# Patient Record
Sex: FEMALE | Race: WHITE | Employment: UNEMPLOYED | URBAN - METROPOLITAN AREA
[De-identification: names, ages, dates, MRNs, and addresses within clinical notes are randomized per-mention and may not be internally consistent; named-entity substitution may affect disease eponyms.]

---

## 2019-01-24 PROBLEM — Z86.69 HISTORY OF SEIZURES AS A CHILD: Status: ACTIVE | Noted: 2019-01-24

## 2019-01-24 PROBLEM — Z87.81 HISTORY OF PELVIC FRACTURE: Status: ACTIVE | Noted: 2019-01-24

## 2019-08-27 ENCOUNTER — HOSPITAL ENCOUNTER (EMERGENCY)
Age: 24
Discharge: HOME | End: 2019-08-28
Attending: EMERGENCY MEDICINE
Payer: COMMERCIAL

## 2019-08-27 DIAGNOSIS — O03.9 ABORTION, SPONTANEOUS COMPLETE: Primary | ICD-10-CM

## 2019-08-27 PROCEDURE — 99284 EMERGENCY DEPT VISIT MOD MDM: CPT

## 2019-08-27 ASSESSMENT — PAIN SCALES - GENERAL: PAINLEVEL_OUTOF10: 9

## 2019-08-27 ASSESSMENT — PAIN DESCRIPTION - ONSET: ONSET: ON-GOING

## 2019-08-27 ASSESSMENT — PAIN DESCRIPTION - DESCRIPTORS: DESCRIPTORS: DISCOMFORT;STABBING;ACHING

## 2019-08-27 ASSESSMENT — PAIN - FUNCTIONAL ASSESSMENT: PAIN_FUNCTIONAL_ASSESSMENT: PREVENTS OR INTERFERES SOME ACTIVE ACTIVITIES AND ADLS

## 2019-08-27 ASSESSMENT — PAIN DESCRIPTION - ORIENTATION: ORIENTATION: LOWER

## 2019-08-27 ASSESSMENT — PAIN DESCRIPTION - LOCATION: LOCATION: ABDOMEN

## 2019-08-27 ASSESSMENT — PAIN DESCRIPTION - FREQUENCY: FREQUENCY: CONTINUOUS

## 2019-08-28 ENCOUNTER — APPOINTMENT (OUTPATIENT)
Dept: ULTRASOUND IMAGING | Age: 24
End: 2019-08-28
Payer: COMMERCIAL

## 2019-08-28 VITALS
DIASTOLIC BLOOD PRESSURE: 73 MMHG | TEMPERATURE: 99 F | HEART RATE: 71 BPM | OXYGEN SATURATION: 98 % | HEIGHT: 65 IN | BODY MASS INDEX: 23.52 KG/M2 | SYSTOLIC BLOOD PRESSURE: 118 MMHG | WEIGHT: 141.13 LBS | RESPIRATION RATE: 18 BRPM

## 2019-08-28 LAB
ALBUMIN SERPL-MCNC: 4.2 G/DL (ref 3.5–5.2)
ALP BLD-CCNC: 153 U/L (ref 35–104)
ALT SERPL-CCNC: 7 U/L (ref 0–32)
ANION GAP SERPL CALCULATED.3IONS-SCNC: 11 MMOL/L (ref 7–16)
AST SERPL-CCNC: 13 U/L (ref 0–31)
BASOPHILS ABSOLUTE: 0.11 E9/L (ref 0–0.2)
BASOPHILS RELATIVE PERCENT: 1.3 % (ref 0–2)
BILIRUB SERPL-MCNC: <0.2 MG/DL (ref 0–1.2)
BILIRUBIN URINE: NEGATIVE
BLOOD, URINE: NEGATIVE
BUN BLDV-MCNC: 14 MG/DL (ref 6–20)
CALCIUM SERPL-MCNC: 8.7 MG/DL (ref 8.6–10.2)
CHLORIDE BLD-SCNC: 106 MMOL/L (ref 98–107)
CLARITY: CLEAR
CO2: 26 MMOL/L (ref 22–29)
COLOR: YELLOW
CREAT SERPL-MCNC: 1 MG/DL (ref 0.5–1)
EOSINOPHILS ABSOLUTE: 0.71 E9/L (ref 0.05–0.5)
EOSINOPHILS RELATIVE PERCENT: 8.2 % (ref 0–6)
GFR AFRICAN AMERICAN: >60
GFR NON-AFRICAN AMERICAN: >60 ML/MIN/1.73
GLUCOSE BLD-MCNC: 90 MG/DL (ref 74–99)
GLUCOSE URINE: NEGATIVE MG/DL
GONADOTROPIN, CHORIONIC (HCG) QUANT: <0.1 MIU/ML
HCT VFR BLD CALC: 40 % (ref 34–48)
HEMOGLOBIN: 13.1 G/DL (ref 11.5–15.5)
IMMATURE GRANULOCYTES #: 0.01 E9/L
IMMATURE GRANULOCYTES %: 0.1 % (ref 0–5)
KETONES, URINE: NEGATIVE MG/DL
LEUKOCYTE ESTERASE, URINE: NEGATIVE
LYMPHOCYTES ABSOLUTE: 2.93 E9/L (ref 1.5–4)
LYMPHOCYTES RELATIVE PERCENT: 33.8 % (ref 20–42)
MCH RBC QN AUTO: 27.5 PG (ref 26–35)
MCHC RBC AUTO-ENTMCNC: 32.8 % (ref 32–34.5)
MCV RBC AUTO: 83.9 FL (ref 80–99.9)
MONOCYTES ABSOLUTE: 0.77 E9/L (ref 0.1–0.95)
MONOCYTES RELATIVE PERCENT: 8.9 % (ref 2–12)
NEUTROPHILS ABSOLUTE: 4.13 E9/L (ref 1.8–7.3)
NEUTROPHILS RELATIVE PERCENT: 47.7 % (ref 43–80)
NITRITE, URINE: NEGATIVE
PDW BLD-RTO: 14.8 FL (ref 11.5–15)
PH UA: 6 (ref 5–9)
PLATELET # BLD: 305 E9/L (ref 130–450)
PMV BLD AUTO: 11.9 FL (ref 7–12)
POTASSIUM SERPL-SCNC: 4.1 MMOL/L (ref 3.5–5)
PROTEIN UA: NEGATIVE MG/DL
RBC # BLD: 4.77 E12/L (ref 3.5–5.5)
SODIUM BLD-SCNC: 143 MMOL/L (ref 132–146)
SPECIFIC GRAVITY UA: 1.02 (ref 1–1.03)
TOTAL PROTEIN: 6.8 G/DL (ref 6.4–8.3)
UROBILINOGEN, URINE: 0.2 E.U./DL
WBC # BLD: 8.7 E9/L (ref 4.5–11.5)

## 2019-08-28 PROCEDURE — 6370000000 HC RX 637 (ALT 250 FOR IP): Performed by: EMERGENCY MEDICINE

## 2019-08-28 PROCEDURE — 84702 CHORIONIC GONADOTROPIN TEST: CPT

## 2019-08-28 PROCEDURE — 80053 COMPREHEN METABOLIC PANEL: CPT

## 2019-08-28 PROCEDURE — 76817 TRANSVAGINAL US OBSTETRIC: CPT

## 2019-08-28 PROCEDURE — 85025 COMPLETE CBC W/AUTO DIFF WBC: CPT

## 2019-08-28 PROCEDURE — 81003 URINALYSIS AUTO W/O SCOPE: CPT

## 2019-08-28 PROCEDURE — 36415 COLL VENOUS BLD VENIPUNCTURE: CPT

## 2019-08-28 RX ORDER — HYDROCODONE BITARTRATE AND ACETAMINOPHEN 5; 325 MG/1; MG/1
1 TABLET ORAL ONCE
Status: COMPLETED | OUTPATIENT
Start: 2019-08-28 | End: 2019-08-28

## 2019-08-28 RX ADMIN — HYDROCODONE BITARTRATE AND ACETAMINOPHEN 1 TABLET: 5; 325 TABLET ORAL at 01:31

## 2019-08-28 ASSESSMENT — ENCOUNTER SYMPTOMS
VOMITING: 0
ABDOMINAL PAIN: 1
NAUSEA: 0
COUGH: 0
EYE DISCHARGE: 0
ABDOMINAL DISTENTION: 0
WHEEZING: 0
EYE REDNESS: 0
BACK PAIN: 0
DIARRHEA: 0
SORE THROAT: 0
SHORTNESS OF BREATH: 0
SINUS PRESSURE: 0
EYE PAIN: 0

## 2021-02-17 ENCOUNTER — APPOINTMENT (OUTPATIENT)
Dept: GENERAL RADIOLOGY | Age: 26
End: 2021-02-17
Payer: COMMERCIAL

## 2021-02-17 ENCOUNTER — HOSPITAL ENCOUNTER (EMERGENCY)
Age: 26
Discharge: HOME | End: 2021-02-17
Payer: COMMERCIAL

## 2021-02-17 VITALS
HEART RATE: 71 BPM | HEIGHT: 65 IN | OXYGEN SATURATION: 96 % | DIASTOLIC BLOOD PRESSURE: 67 MMHG | BODY MASS INDEX: 23.32 KG/M2 | RESPIRATION RATE: 20 BRPM | SYSTOLIC BLOOD PRESSURE: 99 MMHG | WEIGHT: 140 LBS | TEMPERATURE: 97.7 F

## 2021-02-17 DIAGNOSIS — S76.011A HIP STRAIN, RIGHT, INITIAL ENCOUNTER: Primary | ICD-10-CM

## 2021-02-17 LAB
HCG, URINE, POC: NEGATIVE
Lab: NORMAL
NEGATIVE QC PASS/FAIL: NORMAL
POSITIVE QC PASS/FAIL: NORMAL

## 2021-02-17 PROCEDURE — 6370000000 HC RX 637 (ALT 250 FOR IP): Performed by: PHYSICIAN ASSISTANT

## 2021-02-17 PROCEDURE — 99285 EMERGENCY DEPT VISIT HI MDM: CPT

## 2021-02-17 PROCEDURE — 73502 X-RAY EXAM HIP UNI 2-3 VIEWS: CPT

## 2021-02-17 RX ORDER — CYCLOBENZAPRINE HCL 10 MG
10 TABLET ORAL ONCE
Status: COMPLETED | OUTPATIENT
Start: 2021-02-17 | End: 2021-02-17

## 2021-02-17 RX ORDER — CYCLOBENZAPRINE HCL 10 MG
10 TABLET ORAL 3 TIMES DAILY PRN
Qty: 21 TABLET | Refills: 0 | Status: SHIPPED | OUTPATIENT
Start: 2021-02-17 | End: 2021-02-27

## 2021-02-17 RX ORDER — ACETAMINOPHEN 500 MG
1000 TABLET ORAL ONCE
Status: COMPLETED | OUTPATIENT
Start: 2021-02-17 | End: 2021-02-17

## 2021-02-17 RX ADMIN — ACETAMINOPHEN 1000 MG: 500 TABLET, COATED ORAL at 10:55

## 2021-02-17 RX ADMIN — CYCLOBENZAPRINE HYDROCHLORIDE 10 MG: 10 TABLET, FILM COATED ORAL at 10:56

## 2021-02-17 ASSESSMENT — ENCOUNTER SYMPTOMS
COLOR CHANGE: 0
FACIAL SWELLING: 0
SINUS PAIN: 0
BACK PAIN: 0
SHORTNESS OF BREATH: 0
TROUBLE SWALLOWING: 0
COUGH: 0
CHEST TIGHTNESS: 0
SORE THROAT: 0
SINUS PRESSURE: 0

## 2021-02-17 ASSESSMENT — PAIN DESCRIPTION - DESCRIPTORS: DESCRIPTORS: STABBING

## 2021-02-17 NOTE — ED PROVIDER NOTES
[facility]        Department of Emergency Medicine   ED  Provider Note  Admit Date/RoomTime: 2/17/2021 10:23 AM  ED Room: 14/14  HPI:  2/17/21, Time: 10:53 AM EST      The patient is a 66-year-old female presenting to the emergency department with right hip pain. Patient states 6 days ago she dove onto the ground to catch her son from falling and landed predominantly on her right hip and buttock. She states since then she has had pain radiating from her hip down into her knee. The pain is worse with bearing weight and movement. Patient has been taking Tylenol and ibuprofen without relief. She did go to urgent care several days ago and states they only x-rayed her knee which was unremarkable. Patient describes it as sharp aching pains. She denies any additional injury, swelling in the leg, numbness or tingling in the lower extremity, low back pain, dysuria, hematuria. The history is provided by the patient. No  was used. REVIEW OF SYSTEMS:  Review of Systems   Constitutional: Negative for activity change, chills, fatigue and fever. HENT: Negative for congestion, ear pain, facial swelling, sinus pressure, sinus pain, sore throat and trouble swallowing. Respiratory: Negative for cough, chest tightness and shortness of breath. Cardiovascular: Negative for chest pain, palpitations and leg swelling. Genitourinary: Negative for dysuria, flank pain, frequency and hematuria. Musculoskeletal: Positive for arthralgias. Negative for back pain, neck pain and neck stiffness. Skin: Negative for color change, pallor and rash. Neurological: Negative for dizziness, weakness, light-headedness and headaches. Psychiatric/Behavioral: Negative for agitation, behavioral problems and confusion.       Pertinent positives and negatives are stated within HPI, all other systems reviewed and are negative.      --------------------------------------------- PAST HISTORY ---------------------------------------------  Past Medical History:  has a past medical history of Acid reflux, ADD (attention deficit disorder with hyperactivity), ADHD (attention deficit hyperactivity disorder), Asthma, [facility] Fetal alcohol syndrome, Hypoglycemia, Polyhydramnios, PTSD (post-traumatic stress disorder), and Seizures ([facility] Past Surgical History:  has no past surgical history on file. Social History:  reports that she has been smoking cigarettes. She has been smoking about 0.25 packs per day. She has never used smokeless tobacco. She reports that she does not drink alcohol or use drugs. Family History: family history is not on file. The patients home medications have been reviewed. Allergies: Latex, Bee venom, Zithromax [azithromycin], Amoxil [amoxicillin], Aspirin, Clindamycin/lincomycin, Gramineae pollens, Penicillins, Penicillins cross reactors, Pineapple, Prednisone, Tramadol, and Zofran [ondansetron hcl]    -------------------------------------------------- RESULTS -------------------------------------------------  All laboratory and radiology results have been personally reviewed by myself   LABS:  Results for orders placed or performed during the hospital encounter of 02/17/21   POC Pregnancy Urine   Result Value Ref Range    HCG, Urine, POC Negative Negative    Lot Number [id number]     Positive QC Pass/Fail Pass     Negative QC Pass/Fail Pass        RADIOLOGY:  Interpreted by Radiologist.  XR HIP RIGHT (2-3 VIEWS)   Final Result   No acute osseous abnormality is identified.          ------------------------- NURSING NOTES AND VITALS REVIEWED ---------------------------   The nursing notes within the ED encounter and vital signs as below have been reviewed.    BP 99/67   Pulse 71   Temp 97.7 °F (36.5 °C) (Oral)   Resp 20   Ht 5' 5\" (1.651 m)   Wt 140 lb (63.5 kg)   LMP 02/10/2021   SpO2 96%   BMI 23.30 kg/m²   Oxygen Saturation Interpretation: Normal      ---------------------------------------------------PHYSICAL EXAM--------------------------------------    Physical Exam  Vitals signs and nursing note reviewed. Constitutional:       General: She is not in acute distress. Appearance: Normal appearance. She is well-developed. She is not ill-appearing. HENT:      Head: Normocephalic and atraumatic. Mouth/Throat:      Mouth: Mucous membranes are moist.      Pharynx: Oropharynx is clear. Neck:      Musculoskeletal: Normal range of motion and neck supple. No muscular tenderness. Vascular: No JVD. Trachea: No tracheal deviation. Cardiovascular:      Rate and Rhythm: Normal rate and regular rhythm. Heart sounds: Normal heart sounds. No murmur. Pulmonary:      Effort: Pulmonary effort is normal. No respiratory distress. Breath sounds: Normal breath sounds. Musculoskeletal: Normal range of motion. General: Tenderness present. No deformity. Comments: Tenderness over the right lateral hip and proximal femur. Tenderness over the right buttock. No deformity or bruising. Pain with internal and external rotation. +5/5 BLE strength. Skin:     General: Skin is warm and dry. Capillary Refill: Capillary refill takes less than 2 seconds. Coloration: Skin is not pale. Findings: No erythema. Neurological:      Mental Status: She is alert and oriented to person, place, and time. Mental status is at baseline. Motor: No weakness. Psychiatric:         Behavior: Behavior normal.         Thought Content: Thought content normal.            ------------------------------ ED COURSE/MEDICAL DECISION MAKING----------------------  Medications   acetaminophen (TYLENOL) tablet 1,000 mg (1,000 mg Oral Given 2/17/21 1055)   cyclobenzaprine (FLEXERIL) tablet 10 mg (10 mg Oral Given 2/17/21 1056)         ED COURSE:      XR HIP RIGHT (2-3 VIEWS)   Final Result   No acute osseous abnormality is identified. Procedures:  Procedures     Medical Decision Making:   MDM   78-year-old female presenting the emergency with 6 days ago. Patient has no signs of neurovascular compromise. She states she is allergic to Toradol and just took ibuprofen prior to arrival.  She is given Tylenol and Flexeril. Patient does report her pain is slightly improved. X-ray shows no acute fractures or dislocations. This is likely hip strain versus contusion. Patient is encouraged to continue taking Tylenol and ibuprofen and given a prescription for the Flexeril. She is given crutches to use as needed if bearing weight is making the pain worse. She is to follow-up with her PCP within a week if symptoms are not improving for further treatment and management. Counseling: The emergency provider has spoken with the patient and discussed todays results, in addition to providing specific details for the plan of care and counseling regarding the diagnosis and prognosis. Questions are answered at this time and they are agreeable with the plan.      --------------------------------- IMPRESSION AND DISPOSITION ---------------------------------    IMPRESSION  1. Hip strain, right, initial encounter        DISPOSITION  Disposition: Discharge to home  Patient condition is good      Electronically signed by Fernando Barkley PA-C   DD: 2/17/21  **This report was transcribed using voice recognition software. Every effort was made to ensure accuracy; however, inadvertent computerized transcription errors may be present.   END OF ED PROVIDER NOTE         Fernando Barkley PA-C  02/17/21 1148

## 2021-04-19 ENCOUNTER — HOSPITAL ENCOUNTER (EMERGENCY)
Age: 26
Discharge: HOME | End: 2021-04-19
Payer: COMMERCIAL

## 2023-04-12 ENCOUNTER — HOSPITAL ENCOUNTER (OUTPATIENT)
Dept: DATA CONVERSION | Facility: HOSPITAL | Age: 28
End: 2023-04-12
Attending: OBSTETRICS & GYNECOLOGY | Admitting: OBSTETRICS & GYNECOLOGY
Payer: COMMERCIAL

## 2023-04-12 DIAGNOSIS — Z88.0 ALLERGY STATUS TO PENICILLIN: ICD-10-CM

## 2023-04-12 DIAGNOSIS — Z30.2 ENCOUNTER FOR STERILIZATION: ICD-10-CM

## 2023-04-12 DIAGNOSIS — Z91.040 LATEX ALLERGY STATUS: ICD-10-CM

## 2023-04-12 DIAGNOSIS — J45.909 UNSPECIFIED ASTHMA, UNCOMPLICATED (HHS-HCC): ICD-10-CM

## 2023-04-12 DIAGNOSIS — F32.A DEPRESSION, UNSPECIFIED: ICD-10-CM

## 2023-04-12 DIAGNOSIS — F41.9 ANXIETY DISORDER, UNSPECIFIED: ICD-10-CM

## 2023-04-12 DIAGNOSIS — N92.1 EXCESSIVE AND FREQUENT MENSTRUATION WITH IRREGULAR CYCLE: ICD-10-CM

## 2023-04-12 DIAGNOSIS — F17.200 NICOTINE DEPENDENCE, UNSPECIFIED, UNCOMPLICATED: ICD-10-CM

## 2023-04-12 DIAGNOSIS — D06.9 CARCINOMA IN SITU OF CERVIX, UNSPECIFIED: ICD-10-CM

## 2023-04-12 LAB
ATRIAL RATE: 75 BPM
P AXIS: 18 DEGREES
PR INTERVAL: 143 MS
Q ONSET: 252 MS
QRS COUNT: 12 BEATS
QRS DURATION: 97 MS
QT INTERVAL: 392 MS
QTC CALCULATION(BAZETT): 438 MS
QTC FREDERICIA: 422 MS
R AXIS: 52 DEGREES
T AXIS: 49 DEGREES
T OFFSET: 448 MS
VENTRICULAR RATE: 75 BPM

## 2023-04-19 LAB
COMPLETE PATHOLOGY REPORT: NORMAL
CONVERTED CLINICAL DIAGNOSIS-HISTORY: NORMAL
CONVERTED FINAL DIAGNOSIS: NORMAL
CONVERTED FINAL REPORT PDF LINK TO COPY AND PASTE: NORMAL
CONVERTED GROSS DESCRIPTION: NORMAL

## 2023-05-02 LAB — HCG, URINE: NEGATIVE

## 2023-06-08 ENCOUNTER — OFFICE VISIT (OUTPATIENT)
Dept: PRIMARY CARE | Facility: CLINIC | Age: 28
End: 2023-06-08
Payer: COMMERCIAL

## 2023-06-08 VITALS
BODY MASS INDEX: 22.92 KG/M2 | HEART RATE: 116 BPM | DIASTOLIC BLOOD PRESSURE: 72 MMHG | WEIGHT: 137.6 LBS | SYSTOLIC BLOOD PRESSURE: 114 MMHG | HEIGHT: 65 IN | OXYGEN SATURATION: 98 % | RESPIRATION RATE: 16 BRPM

## 2023-06-08 DIAGNOSIS — Z13.220 SCREENING FOR HYPERLIPIDEMIA: ICD-10-CM

## 2023-06-08 DIAGNOSIS — R11.2 NAUSEA AND VOMITING, UNSPECIFIED VOMITING TYPE: ICD-10-CM

## 2023-06-08 DIAGNOSIS — J45.40 MODERATE PERSISTENT ASTHMA, UNSPECIFIED WHETHER COMPLICATED (HHS-HCC): Primary | ICD-10-CM

## 2023-06-08 DIAGNOSIS — F32.A ANXIETY AND DEPRESSION: ICD-10-CM

## 2023-06-08 DIAGNOSIS — E53.8 VITAMIN B 12 DEFICIENCY: ICD-10-CM

## 2023-06-08 DIAGNOSIS — Z13.29 SCREENING FOR HYPOTHYROIDISM: ICD-10-CM

## 2023-06-08 DIAGNOSIS — L25.9 CONTACT DERMATITIS, UNSPECIFIED CONTACT DERMATITIS TYPE, UNSPECIFIED TRIGGER: ICD-10-CM

## 2023-06-08 DIAGNOSIS — Z91.030 BEE ALLERGY STATUS: ICD-10-CM

## 2023-06-08 DIAGNOSIS — E55.9 VITAMIN D DEFICIENCY: ICD-10-CM

## 2023-06-08 DIAGNOSIS — K21.9 GASTROESOPHAGEAL REFLUX DISEASE WITHOUT ESOPHAGITIS: ICD-10-CM

## 2023-06-08 DIAGNOSIS — R73.01 ELEVATED FASTING BLOOD SUGAR: ICD-10-CM

## 2023-06-08 DIAGNOSIS — F41.9 ANXIETY AND DEPRESSION: ICD-10-CM

## 2023-06-08 DIAGNOSIS — G43.809 OTHER MIGRAINE WITHOUT STATUS MIGRAINOSUS, NOT INTRACTABLE: ICD-10-CM

## 2023-06-08 PROBLEM — B96.89 BACTERIAL VAGINOSIS: Status: RESOLVED | Noted: 2023-06-08 | Resolved: 2023-06-08

## 2023-06-08 PROBLEM — O26.891 VAGINAL DISCHARGE DURING PREGNANCY IN FIRST TRIMESTER (HHS-HCC): Status: ACTIVE | Noted: 2023-06-08

## 2023-06-08 PROBLEM — J06.9 UPPER RESPIRATORY INFECTION, ACUTE: Status: RESOLVED | Noted: 2023-06-08 | Resolved: 2023-06-08

## 2023-06-08 PROBLEM — O99.340 DEPRESSION AFFECTING PREGNANCY (HHS-HCC): Status: RESOLVED | Noted: 2019-01-07 | Resolved: 2023-06-08

## 2023-06-08 PROBLEM — O03.9 SAB (SPONTANEOUS ABORTION) (HHS-HCC): Status: RESOLVED | Noted: 2023-06-08 | Resolved: 2023-06-08

## 2023-06-08 PROBLEM — G43.909 MIGRAINE WITHOUT STATUS MIGRAINOSUS, NOT INTRACTABLE: Status: ACTIVE | Noted: 2023-06-08

## 2023-06-08 PROBLEM — O26.841: Status: RESOLVED | Noted: 2023-06-08 | Resolved: 2023-06-08

## 2023-06-08 PROBLEM — O09.899 SUPERVISION OF OTHER HIGH RISK PREGNANCY, ANTEPARTUM (HHS-HCC): Status: ACTIVE | Noted: 2019-01-04

## 2023-06-08 PROBLEM — O21.9 NAUSEA/VOMITING IN PREGNANCY (HHS-HCC): Status: RESOLVED | Noted: 2023-06-08 | Resolved: 2023-06-08

## 2023-06-08 PROBLEM — R09.89 CHEST CONGESTION: Status: ACTIVE | Noted: 2023-06-08

## 2023-06-08 PROBLEM — Z97.5 IUD (INTRAUTERINE DEVICE) IN PLACE: Status: ACTIVE | Noted: 2023-06-08

## 2023-06-08 PROBLEM — O99.519 ASTHMA AFFECTING PREGNANCY, ANTEPARTUM (HHS-HCC): Status: ACTIVE | Noted: 2019-01-07

## 2023-06-08 PROBLEM — O20.0 THREATENED ABORTION (HHS-HCC): Status: ACTIVE | Noted: 2023-06-08

## 2023-06-08 PROBLEM — O26.899 HEARTBURN IN PREGNANCY (HHS-HCC): Status: ACTIVE | Noted: 2023-06-08

## 2023-06-08 PROBLEM — O26.841: Status: ACTIVE | Noted: 2023-06-08

## 2023-06-08 PROBLEM — T21.55XA: Status: ACTIVE | Noted: 2023-06-08

## 2023-06-08 PROBLEM — J06.9 UPPER RESPIRATORY INFECTION, ACUTE: Status: ACTIVE | Noted: 2023-06-08

## 2023-06-08 PROBLEM — R87.612 LGSIL ON PAP SMEAR OF CERVIX: Status: RESOLVED | Noted: 2023-06-08 | Resolved: 2023-06-08

## 2023-06-08 PROBLEM — O99.340 DEPRESSION AFFECTING PREGNANCY (HHS-HCC): Status: ACTIVE | Noted: 2019-01-07

## 2023-06-08 PROBLEM — O99.519 ASTHMA AFFECTING PREGNANCY, ANTEPARTUM (HHS-HCC): Status: RESOLVED | Noted: 2019-01-07 | Resolved: 2023-06-08

## 2023-06-08 PROBLEM — N97.9 INFERTILITY, FEMALE: Status: ACTIVE | Noted: 2023-06-08

## 2023-06-08 PROBLEM — Z86.69 HISTORY OF SEIZURES AS A CHILD: Status: RESOLVED | Noted: 2019-01-24 | Resolved: 2023-06-08

## 2023-06-08 PROBLEM — J45.909 ASTHMA AFFECTING PREGNANCY, ANTEPARTUM (HHS-HCC): Status: ACTIVE | Noted: 2019-01-07

## 2023-06-08 PROBLEM — T21.55XA: Status: RESOLVED | Noted: 2023-06-08 | Resolved: 2023-06-08

## 2023-06-08 PROBLEM — R87.612 LGSIL ON PAP SMEAR OF CERVIX: Status: ACTIVE | Noted: 2023-06-08

## 2023-06-08 PROBLEM — S69.92XA FINGER INJURY, LEFT, INITIAL ENCOUNTER: Status: RESOLVED | Noted: 2023-06-08 | Resolved: 2023-06-08

## 2023-06-08 PROBLEM — R10.2 PELVIC PAIN: Status: ACTIVE | Noted: 2023-06-08

## 2023-06-08 PROBLEM — J45.909 ASTHMA AFFECTING PREGNANCY, ANTEPARTUM (HHS-HCC): Status: RESOLVED | Noted: 2019-01-07 | Resolved: 2023-06-08

## 2023-06-08 PROBLEM — O36.5990 INTRAUTERINE GROWTH RESTRICTION (IUGR) AFFECTING CARE OF MOTHER (HHS-HCC): Status: ACTIVE | Noted: 2019-01-07

## 2023-06-08 PROBLEM — S83.91XA SPRAIN OF RIGHT KNEE: Status: RESOLVED | Noted: 2023-06-08 | Resolved: 2023-06-08

## 2023-06-08 PROBLEM — S83.91XA SPRAIN OF RIGHT KNEE: Status: ACTIVE | Noted: 2023-06-08

## 2023-06-08 PROBLEM — R10.2 PELVIC PAIN: Status: RESOLVED | Noted: 2023-06-08 | Resolved: 2023-06-08

## 2023-06-08 PROBLEM — S89.91XA INJURY OF RIGHT KNEE: Status: ACTIVE | Noted: 2023-06-08

## 2023-06-08 PROBLEM — R12 HEARTBURN IN PREGNANCY (HHS-HCC): Status: RESOLVED | Noted: 2023-06-08 | Resolved: 2023-06-08

## 2023-06-08 PROBLEM — O09.899 SUPERVISION OF OTHER HIGH RISK PREGNANCY, ANTEPARTUM (HHS-HCC): Status: RESOLVED | Noted: 2019-01-04 | Resolved: 2023-06-08

## 2023-06-08 PROBLEM — N89.8 VAGINAL DISCHARGE DURING PREGNANCY IN FIRST TRIMESTER (HHS-HCC): Status: RESOLVED | Noted: 2023-06-08 | Resolved: 2023-06-08

## 2023-06-08 PROBLEM — R05.9 COUGH: Status: ACTIVE | Noted: 2023-06-08

## 2023-06-08 PROBLEM — J06.9 VIRAL URI WITH COUGH: Status: ACTIVE | Noted: 2023-06-08

## 2023-06-08 PROBLEM — O21.9 NAUSEA/VOMITING IN PREGNANCY (HHS-HCC): Status: ACTIVE | Noted: 2023-06-08

## 2023-06-08 PROBLEM — K04.7 TOOTH ABSCESS: Status: ACTIVE | Noted: 2023-06-08

## 2023-06-08 PROBLEM — O03.9 SAB (SPONTANEOUS ABORTION) (HHS-HCC): Status: ACTIVE | Noted: 2023-06-08

## 2023-06-08 PROBLEM — R12 HEARTBURN IN PREGNANCY (HHS-HCC): Status: ACTIVE | Noted: 2023-06-08

## 2023-06-08 PROBLEM — S69.90XA THUMB INJURY: Status: ACTIVE | Noted: 2023-06-08

## 2023-06-08 PROBLEM — N76.0 BACTERIAL VAGINOSIS: Status: RESOLVED | Noted: 2023-06-08 | Resolved: 2023-06-08

## 2023-06-08 PROBLEM — S90.30XA FOOT CONTUSION: Status: RESOLVED | Noted: 2023-06-08 | Resolved: 2023-06-08

## 2023-06-08 PROBLEM — N96 RECURRENT PREGNANCY LOSS WITHOUT CURRENT PREGNANCY: Status: ACTIVE | Noted: 2019-01-07

## 2023-06-08 PROBLEM — S69.91XA INJURY OF FINGER OF RIGHT HAND: Status: RESOLVED | Noted: 2023-06-08 | Resolved: 2023-06-08

## 2023-06-08 PROBLEM — N97.9 INFERTILITY, FEMALE: Status: RESOLVED | Noted: 2023-06-08 | Resolved: 2023-06-08

## 2023-06-08 PROBLEM — S69.90XA THUMB INJURY: Status: RESOLVED | Noted: 2023-06-08 | Resolved: 2023-06-08

## 2023-06-08 PROBLEM — S69.92XA FINGER INJURY, LEFT, INITIAL ENCOUNTER: Status: ACTIVE | Noted: 2023-06-08

## 2023-06-08 PROBLEM — S92.515A CLOSED NONDISPLACED FRACTURE OF PROXIMAL PHALANX OF LESSER TOE OF LEFT FOOT: Status: RESOLVED | Noted: 2023-06-08 | Resolved: 2023-06-08

## 2023-06-08 PROBLEM — S69.91XA INJURY OF FINGER OF RIGHT HAND: Status: ACTIVE | Noted: 2023-06-08

## 2023-06-08 PROBLEM — J06.9 VIRAL URI WITH COUGH: Status: RESOLVED | Noted: 2023-06-08 | Resolved: 2023-06-08

## 2023-06-08 PROBLEM — N76.0 BACTERIAL VAGINOSIS: Status: ACTIVE | Noted: 2023-06-08

## 2023-06-08 PROBLEM — S99.921A RIGHT FOOT INJURY: Status: ACTIVE | Noted: 2023-06-08

## 2023-06-08 PROBLEM — N92.6 MISSED PERIOD: Status: RESOLVED | Noted: 2023-06-08 | Resolved: 2023-06-08

## 2023-06-08 PROBLEM — Z97.5 IUD (INTRAUTERINE DEVICE) IN PLACE: Status: RESOLVED | Noted: 2023-06-08 | Resolved: 2023-06-08

## 2023-06-08 PROBLEM — O36.5990 INTRAUTERINE GROWTH RESTRICTION (IUGR) AFFECTING CARE OF MOTHER (HHS-HCC): Status: RESOLVED | Noted: 2019-01-07 | Resolved: 2023-06-08

## 2023-06-08 PROBLEM — R05.9 COUGH: Status: RESOLVED | Noted: 2023-06-08 | Resolved: 2023-06-08

## 2023-06-08 PROBLEM — Z86.69 HISTORY OF SEIZURES AS A CHILD: Status: ACTIVE | Noted: 2019-01-24

## 2023-06-08 PROBLEM — K02.9 PAIN DUE TO DENTAL CARIES: Status: RESOLVED | Noted: 2023-06-08 | Resolved: 2023-06-08

## 2023-06-08 PROBLEM — R09.89 CHEST CONGESTION: Status: RESOLVED | Noted: 2023-06-08 | Resolved: 2023-06-08

## 2023-06-08 PROBLEM — D06.9 CIN III (CERVICAL INTRAEPITHELIAL NEOPLASIA III): Status: ACTIVE | Noted: 2023-06-08

## 2023-06-08 PROBLEM — S92.515A CLOSED NONDISPLACED FRACTURE OF PROXIMAL PHALANX OF LESSER TOE OF LEFT FOOT: Status: ACTIVE | Noted: 2023-06-08

## 2023-06-08 PROBLEM — O26.899 HEARTBURN IN PREGNANCY (HHS-HCC): Status: RESOLVED | Noted: 2023-06-08 | Resolved: 2023-06-08

## 2023-06-08 PROBLEM — S99.921A RIGHT FOOT INJURY: Status: RESOLVED | Noted: 2023-06-08 | Resolved: 2023-06-08

## 2023-06-08 PROBLEM — S89.91XA INJURY OF RIGHT KNEE: Status: RESOLVED | Noted: 2023-06-08 | Resolved: 2023-06-08

## 2023-06-08 PROBLEM — D06.9 CIN III (CERVICAL INTRAEPITHELIAL NEOPLASIA III): Status: RESOLVED | Noted: 2023-06-08 | Resolved: 2023-06-08

## 2023-06-08 PROBLEM — O26.891 VAGINAL DISCHARGE DURING PREGNANCY IN FIRST TRIMESTER (HHS-HCC): Status: RESOLVED | Noted: 2023-06-08 | Resolved: 2023-06-08

## 2023-06-08 PROBLEM — K02.9 PAIN DUE TO DENTAL CARIES: Status: ACTIVE | Noted: 2023-06-08

## 2023-06-08 PROBLEM — S60.012A CONTUSION OF LEFT THUMB WITHOUT DAMAGE TO NAIL: Status: ACTIVE | Noted: 2023-06-08

## 2023-06-08 PROBLEM — B96.89 BACTERIAL VAGINOSIS: Status: ACTIVE | Noted: 2023-06-08

## 2023-06-08 PROBLEM — N92.6 MISSED PERIOD: Status: ACTIVE | Noted: 2023-06-08

## 2023-06-08 PROBLEM — N96 RECURRENT PREGNANCY LOSS WITHOUT CURRENT PREGNANCY: Status: RESOLVED | Noted: 2019-01-07 | Resolved: 2023-06-08

## 2023-06-08 PROBLEM — N89.8 VAGINAL DISCHARGE DURING PREGNANCY IN FIRST TRIMESTER (HHS-HCC): Status: ACTIVE | Noted: 2023-06-08

## 2023-06-08 PROBLEM — S60.012A CONTUSION OF LEFT THUMB WITHOUT DAMAGE TO NAIL: Status: RESOLVED | Noted: 2023-06-08 | Resolved: 2023-06-08

## 2023-06-08 PROBLEM — K04.7 TOOTH ABSCESS: Status: RESOLVED | Noted: 2023-06-08 | Resolved: 2023-06-08

## 2023-06-08 PROBLEM — S90.30XA FOOT CONTUSION: Status: ACTIVE | Noted: 2023-06-08

## 2023-06-08 PROBLEM — O20.0 THREATENED ABORTION (HHS-HCC): Status: RESOLVED | Noted: 2023-06-08 | Resolved: 2023-06-08

## 2023-06-08 PROCEDURE — 4004F PT TOBACCO SCREEN RCVD TLK: CPT

## 2023-06-08 PROCEDURE — 99204 OFFICE O/P NEW MOD 45 MIN: CPT

## 2023-06-08 RX ORDER — HYDROCORTISONE 1 G/100G
CREAM TOPICAL
Status: CANCELLED | OUTPATIENT
Start: 2023-06-08

## 2023-06-08 RX ORDER — ALBUTEROL SULFATE 0.83 MG/ML
SOLUTION RESPIRATORY (INHALATION)
COMMUNITY
Start: 2022-11-08 | End: 2023-06-08 | Stop reason: SDUPTHER

## 2023-06-08 RX ORDER — METOCLOPRAMIDE 10 MG/1
10 TABLET ORAL
Qty: 90 TABLET | Refills: 2 | Status: CANCELLED | OUTPATIENT
Start: 2023-06-08

## 2023-06-08 RX ORDER — BUSPIRONE HYDROCHLORIDE 10 MG/1
10 TABLET ORAL
COMMUNITY
Start: 2023-05-16

## 2023-06-08 RX ORDER — PROMETHAZINE HYDROCHLORIDE 12.5 MG/1
12.5 TABLET ORAL
COMMUNITY
End: 2023-06-08 | Stop reason: SDUPTHER

## 2023-06-08 RX ORDER — OMEPRAZOLE 40 MG/1
40 CAPSULE, DELAYED RELEASE ORAL DAILY
Qty: 90 CAPSULE | Refills: 2 | Status: SHIPPED | OUTPATIENT
Start: 2023-06-08 | End: 2023-07-12 | Stop reason: SDUPTHER

## 2023-06-08 RX ORDER — PROMETHAZINE HYDROCHLORIDE 12.5 MG/1
12.5 TABLET ORAL DAILY PRN
Qty: 30 TABLET | Refills: 0 | Status: SHIPPED | OUTPATIENT
Start: 2023-06-08 | End: 2023-07-12 | Stop reason: SDUPTHER

## 2023-06-08 RX ORDER — HYDROCORTISONE 1 G/100G
CREAM TOPICAL
COMMUNITY
Start: 2022-09-19 | End: 2023-07-12 | Stop reason: SDUPTHER

## 2023-06-08 RX ORDER — RISPERIDONE 0.5 MG/1
0.5 TABLET ORAL DAILY
Qty: 90 TABLET | Refills: 2 | Status: CANCELLED | OUTPATIENT
Start: 2023-06-08

## 2023-06-08 RX ORDER — EPINEPHRINE 0.3 MG/.3ML
INJECTION SUBCUTANEOUS
Qty: 2 EACH | Refills: 0 | Status: SHIPPED | OUTPATIENT
Start: 2023-06-08 | End: 2023-07-12 | Stop reason: SDUPTHER

## 2023-06-08 RX ORDER — ALBUTEROL SULFATE 0.83 MG/ML
SOLUTION RESPIRATORY (INHALATION)
Qty: 75 ML | Refills: 2 | Status: SHIPPED | OUTPATIENT
Start: 2023-06-08 | End: 2023-07-12 | Stop reason: SDUPTHER

## 2023-06-08 RX ORDER — ACETAMINOPHEN 325 MG/1
325 TABLET ORAL EVERY 6 HOURS PRN
COMMUNITY
Start: 2019-01-24 | End: 2023-07-12 | Stop reason: SDUPTHER

## 2023-06-08 RX ORDER — RISPERIDONE 0.5 MG/1
0.5 TABLET ORAL DAILY
COMMUNITY
Start: 2022-09-19

## 2023-06-08 RX ORDER — CLOBETASOL PROPIONATE 0.5 MG/G
CREAM TOPICAL 2 TIMES DAILY
Qty: 60 G | Refills: 2 | Status: SHIPPED | OUTPATIENT
Start: 2023-06-08 | End: 2023-06-22

## 2023-06-08 RX ORDER — EPINEPHRINE 0.3 MG/.3ML
INJECTION SUBCUTANEOUS
COMMUNITY
Start: 2018-10-19 | End: 2023-06-08 | Stop reason: SDUPTHER

## 2023-06-08 RX ORDER — MONTELUKAST SODIUM 10 MG/1
10 TABLET ORAL NIGHTLY
Qty: 90 TABLET | Refills: 0 | Status: SHIPPED | OUTPATIENT
Start: 2023-06-08 | End: 2023-07-12 | Stop reason: SDUPTHER

## 2023-06-08 RX ORDER — IBUPROFEN 800 MG/1
800 TABLET ORAL
COMMUNITY
Start: 2023-05-16 | End: 2023-06-08 | Stop reason: SDUPTHER

## 2023-06-08 RX ORDER — OMEPRAZOLE 40 MG/1
40 CAPSULE, DELAYED RELEASE ORAL DAILY
COMMUNITY
Start: 2022-04-14 | End: 2023-06-08 | Stop reason: SDUPTHER

## 2023-06-08 RX ORDER — CYCLOBENZAPRINE HCL 10 MG
10 TABLET ORAL
Qty: 90 TABLET | Refills: 2 | Status: CANCELLED | OUTPATIENT
Start: 2023-06-08

## 2023-06-08 RX ORDER — METOCLOPRAMIDE 10 MG/1
10 TABLET ORAL
COMMUNITY
Start: 2023-05-28 | End: 2023-06-08 | Stop reason: ALTCHOICE

## 2023-06-08 RX ORDER — CYCLOBENZAPRINE HCL 10 MG
10 TABLET ORAL
COMMUNITY
Start: 2022-10-08 | End: 2023-07-12 | Stop reason: SDUPTHER

## 2023-06-08 RX ORDER — BUSPIRONE HYDROCHLORIDE 10 MG/1
10 TABLET ORAL
Qty: 90 TABLET | Refills: 2 | Status: CANCELLED | OUTPATIENT
Start: 2023-06-08

## 2023-06-08 RX ORDER — MONTELUKAST SODIUM 10 MG/1
10 TABLET ORAL
COMMUNITY
Start: 2023-03-13 | End: 2023-06-08 | Stop reason: SDUPTHER

## 2023-06-08 RX ORDER — IBUPROFEN 800 MG/1
800 TABLET ORAL NIGHTLY
Qty: 90 TABLET | Refills: 0 | Status: SHIPPED | OUTPATIENT
Start: 2023-06-08 | End: 2023-07-12 | Stop reason: SDUPTHER

## 2023-06-08 ASSESSMENT — ENCOUNTER SYMPTOMS
EYES NEGATIVE: 1
PSYCHIATRIC NEGATIVE: 1
ENDOCRINE NEGATIVE: 1
GASTROINTESTINAL NEGATIVE: 1
OCCASIONAL FEELINGS OF UNSTEADINESS: 0
CONSTITUTIONAL NEGATIVE: 1
MUSCULOSKELETAL NEGATIVE: 1
LOSS OF SENSATION IN FEET: 0
CARDIOVASCULAR NEGATIVE: 1
HEMATOLOGIC/LYMPHATIC NEGATIVE: 1
ALLERGIC/IMMUNOLOGIC NEGATIVE: 1
NEUROLOGICAL NEGATIVE: 1
RESPIRATORY NEGATIVE: 1
DEPRESSION: 0

## 2023-06-08 ASSESSMENT — PATIENT HEALTH QUESTIONNAIRE - PHQ9
SUM OF ALL RESPONSES TO PHQ9 QUESTIONS 1 AND 2: 0
1. LITTLE INTEREST OR PLEASURE IN DOING THINGS: NOT AT ALL
2. FEELING DOWN, DEPRESSED OR HOPELESS: NOT AT ALL

## 2023-06-08 ASSESSMENT — ANXIETY QUESTIONNAIRES
2. NOT BEING ABLE TO STOP OR CONTROL WORRYING: NOT AT ALL
5. BEING SO RESTLESS THAT IT IS HARD TO SIT STILL: NOT AT ALL
3. WORRYING TOO MUCH ABOUT DIFFERENT THINGS: NOT AT ALL
GAD7 TOTAL SCORE: 0
IF YOU CHECKED OFF ANY PROBLEMS ON THIS QUESTIONNAIRE, HOW DIFFICULT HAVE THESE PROBLEMS MADE IT FOR YOU TO DO YOUR WORK, TAKE CARE OF THINGS AT HOME, OR GET ALONG WITH OTHER PEOPLE: NOT DIFFICULT AT ALL
7. FEELING AFRAID AS IF SOMETHING AWFUL MIGHT HAPPEN: NOT AT ALL
4. TROUBLE RELAXING: NOT AT ALL
6. BECOMING EASILY ANNOYED OR IRRITABLE: NOT AT ALL
1. FEELING NERVOUS, ANXIOUS, OR ON EDGE: NOT AT ALL

## 2023-06-08 NOTE — PROGRESS NOTES
"Subjective   Patient ID: Cari Reina is a 27 y.o. female who presents for New Patient Visit.    HPI a 27-year-old female with past medical history of anxiety and depression currently followed by [facility] for medication regimen and therapy, chronic nausea and vomiting, allergies to bees, mild persistent asthma, acid reflux, and chronic migraines arrives to clinic with chief complaint of new patient establishment.  The patient is here to establish with a new primary care provider as her previous one is no longer in network.  She does need her daily medication refills.  She continues to follow OB in regards to her women's gynecological exams.  She voices no concerns today.    Review of Systems   Constitutional: Negative.    HENT: Negative.     Eyes: Negative.    Respiratory: Negative.     Cardiovascular: Negative.    Gastrointestinal: Negative.    Endocrine: Negative.    Genitourinary: Negative.    Musculoskeletal: Negative.    Skin: Negative.    Allergic/Immunologic: Negative.    Neurological: Negative.    Hematological: Negative.    Psychiatric/Behavioral: Negative.     All other systems reviewed and are negative.      Objective   /72   Pulse (!) 116   Resp 16   Ht 1.651 m (5' 5\")   Wt 62.4 kg (137 lb 9.6 oz)   SpO2 98%   BMI 22.90 kg/m²     Physical Exam  Vitals and nursing note reviewed.   Constitutional:       Appearance: Normal appearance.   HENT:      Head: Normocephalic and atraumatic.      Right Ear: Tympanic membrane normal.      Left Ear: Tympanic membrane normal.      Nose: Nose normal.      Mouth/Throat:      Mouth: Mucous membranes are moist.      Pharynx: Oropharynx is clear.   Eyes:      Extraocular Movements: Extraocular movements intact.      Conjunctiva/sclera: Conjunctivae normal.      Pupils: Pupils are equal, round, and reactive to light.   Cardiovascular:      Rate and Rhythm: Normal rate and regular rhythm.   Pulmonary:      Effort: Pulmonary effort is normal.      Breath " sounds: Normal breath sounds.   Abdominal:      General: Bowel sounds are normal.      Palpations: Abdomen is soft.   Musculoskeletal:         General: Normal range of motion.      Cervical back: Normal range of motion and neck supple.   Skin:     General: Skin is warm.      Capillary Refill: Capillary refill takes less than 2 seconds.   Neurological:      General: No focal deficit present.      Mental Status: She is alert and oriented to person, place, and time. Mental status is at baseline.   Psychiatric:         Mood and Affect: Mood normal.         Behavior: Behavior normal.         Thought Content: Thought content normal.         Judgment: Judgment normal.         Assessment/Plan   Problem List Items Addressed This Visit          Endocrine/Metabolic    Vitamin B 12 deficiency    Relevant Orders    CBC    Vitamin B12    Follow Up In [facility] - PCP    Vitamin D deficiency    Relevant Orders    Vitamin D, Total    Follow [facility] - PCP       Other    Anxiety and depression    Elevated fasting blood sugar    Relevant Orders    Comprehensive Metabolic Panel    Hemoglobin A1C    Follow Up [facility] - PCP     Other Visit Diagnoses       Moderate persistent asthma, unspecified whether complicated    -  Primary    Relevant Medications    albuterol 2.5 mg /3 mL (0.083 %) nebulizer solution    montelukast (Singulair) 10 mg tablet    Screening for hyperlipidemia        Relevant Orders    Lipid Panel    Follow Up [facility] - PCP    Screening for hypothyroidism        Relevant Orders    TSH with reflex to Free T4 if abnormal    Follow Up [facility] - PCP    Gastroesophageal reflux disease without esophagitis        Relevant Medications    omeprazole (PriLOSEC) 40 mg DR capsule    Nausea and vomiting, unspecified vomiting type        Relevant Medications    promethazine (Phenergan) 12.5 mg tablet    Other migraine without status migrainosus, not  intractable        Relevant Medications    ibuprofen (IBU) 800 mg tablet    Contact dermatitis, unspecified contact dermatitis type, unspecified trigger        Relevant Medications    clobetasol (Temovate) 0.05 % cream    Bee allergy status        Relevant Medications    EPINEPHrine 0.3 mg/0.3 mL injection syringe          It was a pleasure seeing you in the office today.    I have ordered blood work for you to complete prior to your next appointment.  These labs require you to fast.  Your next appointment will be in 1 month.    I have refilled all of your medications as requested to your pharmacy.    Continue the treatment plan regimen set forth by [facility] for your anxiety and depression.    We will discuss medication regimens and health maintenance alterations at your next appointment.    Continue your annual women's gynecological exams and treatment plan set forth by your OB/GYN.    I also advised you to follow low fat diet and exercise for at least 30 minutes daily.    Anticipatory guidance, age appropriate vaccines, screening exams, health promotion and prevention discussed.    This document was generated using the assistance of voice recognition software. If there are any errors of spelling, grammar, syntax, or meaning; please feel free to contact me directly for clarification.

## 2023-06-22 ENCOUNTER — TELEPHONE (OUTPATIENT)
Dept: PRIMARY CARE | Facility: CLINIC | Age: 28
End: 2023-06-22
Payer: COMMERCIAL

## 2023-06-22 NOTE — TELEPHONE ENCOUNTER
Spoke with gastroenterology and they report that any lactose intolerance test do require to be completed in [place].  It is a lactose intolerance breath test.  I do recommend that she stopped eating dairy for 2 weeks to see if her stomach complications stop.

## 2023-06-22 NOTE — TELEPHONE ENCOUNTER
"Patient believes she may be lactose intolerance and is having blood work tomorrow and would like checked. She can eat cheese and be fine but any other dairy products she gets diarrhea and her stomach gets \"bubbly\"   "

## 2023-06-23 ENCOUNTER — TELEPHONE (OUTPATIENT)
Dept: PRIMARY CARE | Facility: CLINIC | Age: 28
End: 2023-06-23

## 2023-06-23 ENCOUNTER — LAB (OUTPATIENT)
Dept: LAB | Facility: LAB | Age: 28
End: 2023-06-23
Payer: COMMERCIAL

## 2023-06-23 DIAGNOSIS — E53.8 VITAMIN B 12 DEFICIENCY: ICD-10-CM

## 2023-06-23 DIAGNOSIS — Z13.29 SCREENING FOR HYPOTHYROIDISM: ICD-10-CM

## 2023-06-23 DIAGNOSIS — Z13.220 SCREENING FOR HYPERLIPIDEMIA: ICD-10-CM

## 2023-06-23 DIAGNOSIS — E55.9 VITAMIN D DEFICIENCY: ICD-10-CM

## 2023-06-23 DIAGNOSIS — R73.01 ELEVATED FASTING BLOOD SUGAR: ICD-10-CM

## 2023-06-23 LAB
ALANINE AMINOTRANSFERASE (SGPT) (U/L) IN SER/PLAS: 17 U/L (ref 7–45)
ALBUMIN (G/DL) IN SER/PLAS: 4.2 G/DL (ref 3.4–5)
ALKALINE PHOSPHATASE (U/L) IN SER/PLAS: 113 U/L (ref 33–110)
ANION GAP IN SER/PLAS: 10 MMOL/L (ref 10–20)
ASPARTATE AMINOTRANSFERASE (SGOT) (U/L) IN SER/PLAS: 11 U/L (ref 9–39)
BILIRUBIN TOTAL (MG/DL) IN SER/PLAS: 0.6 MG/DL (ref 0–1.2)
CALCIDIOL (25 OH VITAMIN D3) (NG/ML) IN SER/PLAS: 27 NG/ML
CALCIUM (MG/DL) IN SER/PLAS: 9 MG/DL (ref 8.6–10.3)
CARBON DIOXIDE, TOTAL (MMOL/L) IN SER/PLAS: 29 MMOL/L (ref 21–32)
CHLORIDE (MMOL/L) IN SER/PLAS: 107 MMOL/L (ref 98–107)
CHOLESTEROL (MG/DL) IN SER/PLAS: 175 MG/DL (ref 0–199)
CHOLESTEROL IN HDL (MG/DL) IN SER/PLAS: 33.3 MG/DL
CHOLESTEROL/HDL RATIO: 5.3
COBALAMIN (VITAMIN B12) (PG/ML) IN SER/PLAS: 413 PG/ML (ref 211–911)
CREATININE (MG/DL) IN SER/PLAS: 0.89 MG/DL (ref 0.5–1.05)
ERYTHROCYTE DISTRIBUTION WIDTH (RATIO) BY AUTOMATED COUNT: 14.5 % (ref 11.5–14.5)
ERYTHROCYTE MEAN CORPUSCULAR HEMOGLOBIN CONCENTRATION (G/DL) BY AUTOMATED: 33.1 G/DL (ref 32–36)
ERYTHROCYTE MEAN CORPUSCULAR VOLUME (FL) BY AUTOMATED COUNT: 86 FL (ref 80–100)
ERYTHROCYTES (10*6/UL) IN BLOOD BY AUTOMATED COUNT: 5.02 X10E12/L (ref 4–5.2)
ESTIMATED AVERAGE GLUCOSE FOR HBA1C: 123 MG/DL
GFR FEMALE: 89 ML/MIN/1.73M2
GLUCOSE (MG/DL) IN SER/PLAS: 76 MG/DL (ref 74–99)
HEMATOCRIT (%) IN BLOOD BY AUTOMATED COUNT: 43.2 % (ref 36–46)
HEMOGLOBIN (G/DL) IN BLOOD: 14.3 G/DL (ref 12–16)
HEMOGLOBIN A1C/HEMOGLOBIN TOTAL IN BLOOD: 5.9 %
LDL: 128 MG/DL (ref 0–99)
LEUKOCYTES (10*3/UL) IN BLOOD BY AUTOMATED COUNT: 6.6 X10E9/L (ref 4.4–11.3)
PLATELETS (10*3/UL) IN BLOOD AUTOMATED COUNT: 318 X10E9/L (ref 150–450)
POTASSIUM (MMOL/L) IN SER/PLAS: 4.9 MMOL/L (ref 3.5–5.3)
PROTEIN TOTAL: 6.5 G/DL (ref 6.4–8.2)
SODIUM (MMOL/L) IN SER/PLAS: 141 MMOL/L (ref 136–145)
THYROTROPIN (MIU/L) IN SER/PLAS BY DETECTION LIMIT <= 0.05 MIU/L: 1.63 MIU/L (ref 0.44–3.98)
TRIGLYCERIDE (MG/DL) IN SER/PLAS: 71 MG/DL (ref 0–149)
UREA NITROGEN (MG/DL) IN SER/PLAS: 12 MG/DL (ref 6–23)
VLDL: 14 MG/DL (ref 0–40)

## 2023-06-23 PROCEDURE — 80061 LIPID PANEL: CPT

## 2023-06-23 PROCEDURE — 84443 ASSAY THYROID STIM HORMONE: CPT

## 2023-06-23 PROCEDURE — 36415 COLL VENOUS BLD VENIPUNCTURE: CPT

## 2023-06-23 PROCEDURE — 83036 HEMOGLOBIN GLYCOSYLATED A1C: CPT

## 2023-06-23 PROCEDURE — 85027 COMPLETE CBC AUTOMATED: CPT

## 2023-06-23 PROCEDURE — 82306 VITAMIN D 25 HYDROXY: CPT

## 2023-06-23 PROCEDURE — 82607 VITAMIN B-12: CPT

## 2023-06-23 PROCEDURE — 80053 COMPREHEN METABOLIC PANEL: CPT

## 2023-06-23 NOTE — TELEPHONE ENCOUNTER
Please notify the patient.    TSH: 1.63. NORMAL    VIT B12: 413. NORMAL    VIT D: 27. LOW. Begin OTC Vitamin D3 + Calcium    LIPID PANEL: NORMAL    HEMOGLOBIN A1C: 5.9% PREDIABETES. Begin healthy eating, diet, exercise. Decrease carbohydrate and sugary intake. No medications necessary at this time.    CBC + CMP: Kidney, liver, electrolyte panel is normal. No signs of infection or anemia.

## 2023-07-11 PROBLEM — J30.9 ALLERGIC RHINITIS: Status: RESOLVED | Noted: 2023-07-11 | Resolved: 2023-07-11

## 2023-07-11 PROBLEM — F17.210 CIGARETTE NICOTINE DEPENDENCE WITHOUT COMPLICATION: Status: RESOLVED | Noted: 2023-07-11 | Resolved: 2023-07-11

## 2023-07-11 RX ORDER — METOCLOPRAMIDE 10 MG/1
10 TABLET ORAL 4 TIMES DAILY
COMMUNITY
End: 2023-07-12 | Stop reason: SDUPTHER

## 2023-07-12 ENCOUNTER — OFFICE VISIT (OUTPATIENT)
Dept: PRIMARY CARE | Facility: CLINIC | Age: 28
End: 2023-07-12
Payer: COMMERCIAL

## 2023-07-12 VITALS
WEIGHT: 134 LBS | HEART RATE: 80 BPM | RESPIRATION RATE: 16 BRPM | OXYGEN SATURATION: 97 % | SYSTOLIC BLOOD PRESSURE: 114 MMHG | DIASTOLIC BLOOD PRESSURE: 78 MMHG | HEIGHT: 65 IN | BODY MASS INDEX: 22.33 KG/M2

## 2023-07-12 DIAGNOSIS — Z91.030 BEE ALLERGY STATUS: ICD-10-CM

## 2023-07-12 DIAGNOSIS — E53.8 VITAMIN B 12 DEFICIENCY: ICD-10-CM

## 2023-07-12 DIAGNOSIS — M54.9 BACK PAIN, UNSPECIFIED BACK LOCATION, UNSPECIFIED BACK PAIN LATERALITY, UNSPECIFIED CHRONICITY: ICD-10-CM

## 2023-07-12 DIAGNOSIS — L25.9 CONTACT DERMATITIS, UNSPECIFIED CONTACT DERMATITIS TYPE, UNSPECIFIED TRIGGER: Primary | ICD-10-CM

## 2023-07-12 DIAGNOSIS — J45.40 MODERATE PERSISTENT ASTHMA, UNSPECIFIED WHETHER COMPLICATED (HHS-HCC): ICD-10-CM

## 2023-07-12 DIAGNOSIS — G43.809 OTHER MIGRAINE WITHOUT STATUS MIGRAINOSUS, NOT INTRACTABLE: ICD-10-CM

## 2023-07-12 DIAGNOSIS — E55.9 VITAMIN D DEFICIENCY: ICD-10-CM

## 2023-07-12 DIAGNOSIS — K31.89 STOMACH SPASM: ICD-10-CM

## 2023-07-12 DIAGNOSIS — R73.01 ELEVATED FASTING BLOOD SUGAR: ICD-10-CM

## 2023-07-12 DIAGNOSIS — R11.2 NAUSEA AND VOMITING, UNSPECIFIED VOMITING TYPE: ICD-10-CM

## 2023-07-12 DIAGNOSIS — Z13.220 SCREENING FOR HYPERLIPIDEMIA: ICD-10-CM

## 2023-07-12 DIAGNOSIS — K21.9 GASTROESOPHAGEAL REFLUX DISEASE WITHOUT ESOPHAGITIS: ICD-10-CM

## 2023-07-12 PROBLEM — O36.5990: Status: ACTIVE | Noted: 2019-01-07

## 2023-07-12 PROCEDURE — 99213 OFFICE O/P EST LOW 20 MIN: CPT

## 2023-07-12 PROCEDURE — 4004F PT TOBACCO SCREEN RCVD TLK: CPT

## 2023-07-12 RX ORDER — MONTELUKAST SODIUM 10 MG/1
10 TABLET ORAL NIGHTLY
Qty: 90 TABLET | Refills: 0 | Status: SHIPPED | OUTPATIENT
Start: 2023-07-12 | End: 2023-10-10

## 2023-07-12 RX ORDER — ACETAMINOPHEN 325 MG/1
325 TABLET ORAL EVERY 6 HOURS PRN
Qty: 30 TABLET | Refills: 1 | Status: SHIPPED | OUTPATIENT
Start: 2023-07-12

## 2023-07-12 RX ORDER — IBUPROFEN 800 MG/1
800 TABLET ORAL NIGHTLY
Qty: 90 TABLET | Refills: 0 | Status: SHIPPED | OUTPATIENT
Start: 2023-07-12 | End: 2023-10-10

## 2023-07-12 RX ORDER — OMEPRAZOLE 40 MG/1
40 CAPSULE, DELAYED RELEASE ORAL DAILY
Qty: 90 CAPSULE | Refills: 2 | Status: SHIPPED | OUTPATIENT
Start: 2023-07-12 | End: 2024-01-31 | Stop reason: SDUPTHER

## 2023-07-12 RX ORDER — EPINEPHRINE 0.3 MG/.3ML
INJECTION SUBCUTANEOUS
Qty: 2 EACH | Refills: 0 | Status: SHIPPED | OUTPATIENT
Start: 2023-07-12 | End: 2024-01-31 | Stop reason: SDUPTHER

## 2023-07-12 RX ORDER — DEXTROSE 4 G
TABLET,CHEWABLE ORAL
Qty: 1 EACH | Refills: 0 | Status: SHIPPED | OUTPATIENT
Start: 2023-07-12 | End: 2024-07-11

## 2023-07-12 RX ORDER — HYDROCORTISONE 1 G/100G
1 CREAM TOPICAL 2 TIMES DAILY PRN
Qty: 57 G | Refills: 0 | Status: SHIPPED | OUTPATIENT
Start: 2023-07-12

## 2023-07-12 RX ORDER — METOCLOPRAMIDE 10 MG/1
10 TABLET ORAL 4 TIMES DAILY
Qty: 90 TABLET | Refills: 2 | Status: SHIPPED | OUTPATIENT
Start: 2023-07-12

## 2023-07-12 RX ORDER — LANCETS
EACH MISCELLANEOUS
Qty: 100 EACH | Refills: 3 | Status: SHIPPED | OUTPATIENT
Start: 2023-07-12 | End: 2024-01-31 | Stop reason: SDUPTHER

## 2023-07-12 RX ORDER — PROMETHAZINE HYDROCHLORIDE 12.5 MG/1
12.5 TABLET ORAL DAILY PRN
Qty: 30 TABLET | Refills: 1 | Status: SHIPPED | OUTPATIENT
Start: 2023-07-12 | End: 2023-08-11

## 2023-07-12 RX ORDER — CYCLOBENZAPRINE HCL 10 MG
10 TABLET ORAL 2 TIMES DAILY PRN
Qty: 30 TABLET | Refills: 2 | Status: SHIPPED | OUTPATIENT
Start: 2023-07-12 | End: 2024-05-15 | Stop reason: SDUPTHER

## 2023-07-12 RX ORDER — ALBUTEROL SULFATE 0.83 MG/ML
SOLUTION RESPIRATORY (INHALATION)
Qty: 75 ML | Refills: 2 | Status: SHIPPED | OUTPATIENT
Start: 2023-07-12 | End: 2024-05-15 | Stop reason: SDUPTHER

## 2023-07-12 ASSESSMENT — ANXIETY QUESTIONNAIRES
7. FEELING AFRAID AS IF SOMETHING AWFUL MIGHT HAPPEN: NOT AT ALL
4. TROUBLE RELAXING: NOT AT ALL
3. WORRYING TOO MUCH ABOUT DIFFERENT THINGS: NOT AT ALL
5. BEING SO RESTLESS THAT IT IS HARD TO SIT STILL: NOT AT ALL
IF YOU CHECKED OFF ANY PROBLEMS ON THIS QUESTIONNAIRE, HOW DIFFICULT HAVE THESE PROBLEMS MADE IT FOR YOU TO DO YOUR WORK, TAKE CARE OF THINGS AT HOME, OR GET ALONG WITH OTHER PEOPLE: NOT DIFFICULT AT ALL
GAD7 TOTAL SCORE: 0
1. FEELING NERVOUS, ANXIOUS, OR ON EDGE: NOT AT ALL
6. BECOMING EASILY ANNOYED OR IRRITABLE: NOT AT ALL
2. NOT BEING ABLE TO STOP OR CONTROL WORRYING: NOT AT ALL

## 2023-07-12 ASSESSMENT — ENCOUNTER SYMPTOMS
OCCASIONAL FEELINGS OF UNSTEADINESS: 0
HEMATOLOGIC/LYMPHATIC NEGATIVE: 1
PSYCHIATRIC NEGATIVE: 1
ALLERGIC/IMMUNOLOGIC NEGATIVE: 1
RESPIRATORY NEGATIVE: 1
ENDOCRINE NEGATIVE: 1
GASTROINTESTINAL NEGATIVE: 1
LOSS OF SENSATION IN FEET: 0
DEPRESSION: 0
MUSCULOSKELETAL NEGATIVE: 1
CARDIOVASCULAR NEGATIVE: 1
EYES NEGATIVE: 1
NEUROLOGICAL NEGATIVE: 1
CONSTITUTIONAL NEGATIVE: 1

## 2023-07-12 ASSESSMENT — PATIENT HEALTH QUESTIONNAIRE - PHQ9
1. LITTLE INTEREST OR PLEASURE IN DOING THINGS: NOT AT ALL
SUM OF ALL RESPONSES TO PHQ9 QUESTIONS 1 AND 2: 0
2. FEELING DOWN, DEPRESSED OR HOPELESS: NOT AT ALL

## 2023-07-12 NOTE — PROGRESS NOTES
"Subjective   Patient ID: Cari Reina is a 27 y.o. female who presents for Follow-up.    HPI a 27-year-old female arrives to clinic with chief complaint of 1 month follow-up.  At her last appointment, she was given directions to complete blood work.  She did complete her blood work today and would like to review them.  Other than this, the patient denies any chest pain, shortness of breath, diarrhea, fevers, chills, head pain, COVID-like symptoms.    Review of Systems   Constitutional: Negative.    HENT: Negative.     Eyes: Negative.    Respiratory: Negative.     Cardiovascular: Negative.    Gastrointestinal: Negative.    Endocrine: Negative.    Genitourinary: Negative.    Musculoskeletal: Negative.    Skin: Negative.    Allergic/Immunologic: Negative.    Neurological: Negative.    Hematological: Negative.    Psychiatric/Behavioral: Negative.     All other systems reviewed and are negative.      Objective   /78   Pulse 80   Resp 16   Ht 1.651 m (5' 5\")   Wt 60.8 kg (134 lb)   SpO2 97%   BMI 22.30 kg/m²     Physical Exam  Vitals and nursing note reviewed.   Constitutional:       Appearance: Normal appearance.   HENT:      Head: Normocephalic and atraumatic.      Right Ear: Tympanic membrane normal.      Left Ear: Tympanic membrane normal.      Nose: Nose normal.      Mouth/Throat:      Mouth: Mucous membranes are moist.      Pharynx: Oropharynx is clear.   Eyes:      Extraocular Movements: Extraocular movements intact.      Conjunctiva/sclera: Conjunctivae normal.      Pupils: Pupils are equal, round, and reactive to light.   Cardiovascular:      Rate and Rhythm: Normal rate and regular rhythm.   Pulmonary:      Effort: Pulmonary effort is normal.      Breath sounds: Normal breath sounds.   Abdominal:      General: Bowel sounds are normal.      Palpations: Abdomen is soft.   Musculoskeletal:         General: Normal range of motion.      Cervical back: Normal range of motion and neck supple.   Skin:     " General: Skin is warm.      Capillary Refill: Capillary refill takes less than 2 seconds.   Neurological:      General: No focal deficit present.      Mental Status: She is alert and oriented to person, place, and time. Mental status is at baseline.   Psychiatric:         Mood and Affect: Mood normal.         Behavior: Behavior normal.         Thought Content: Thought content normal.         Judgment: Judgment normal.         Assessment/Plan   Problem List Items Addressed This Visit       Nausea and vomiting    Relevant Medications    metoclopramide (Reglan) 10 mg tablet    promethazine (Phenergan) 12.5 mg tablet    Other Relevant Orders    [facility] - PCP - Established    Vitamin B 12 deficiency    Relevant Orders    [facility] - PCP - Established    Vitamin D deficiency    Relevant Orders    [facility] - PCP - Established    Elevated fasting blood sugar    Relevant Medications    blood-glucose meter misc    lancets misc    Other Relevant Orders    Diabetic Supplies Glucose Monitoring Strips    Follow [facility] - PCP - Established    Migraine without status migrainosus, not intractable    Relevant Medications    acetaminophen (Tylenol) 325 mg tablet    ibuprofen (IBU) 800 mg tablet    Other Relevant Orders    [facility] - PCP - Established    Moderate persistent asthma    Relevant Medications    albuterol 2.5 mg /3 mL (0.083 %) nebulizer solution    montelukast (Singulair) 10 mg tablet    Other Relevant Orders    [facility] - PCP - Established    Gastroesophageal reflux disease without esophagitis    Relevant Medications    omeprazole (PriLOSEC) 40 mg DR capsule    Other Relevant Orders    Follow [facility] - PCP - Established    Bee allergy status    Relevant Medications    EPINEPHrine 0.3 mg/0.3 mL injection syringe    Other Relevant Orders    Follow [facility] - PCP - Established    Contact dermatitis - Primary    Relevant Medications    hydrocortisone acetate 1 % cream    Other Relevant Orders    [facility] - PCP - Established     Other Visit Diagnoses       Screening for hyperlipidemia        Back pain, unspecified back location, unspecified back pain laterality, unspecified chronicity        Relevant Medications    cyclobenzaprine (Flexeril) 10 mg tablet    Other Relevant Orders    [facility] - PCP - Established    Stomach spasm        Relevant Medications    simethicone (Gas-Ex) 125 mg tablet tablet    Other Relevant Orders    Follow Up [facility] - PCP - Established          It was a pleasure seeing you in the office today.    You are vitamin D deficient.  Please begin taking over-the-counter vitamin D3 and calcium.    Your hemoglobin A1c is 5.9% indicating prediabetes.  Begin healthy eating, diet, and exercise.  Decrease sugar and carbohydrate intake.    The rest of your lab work is unremarkable.    Continue the treatment plan set forth by [facility] and [facility].    Next appointment will be in 1 year with Michelle LEMONS.    I also advised you to follow low fat diet and exercise for at least 30 minutes daily.    Anticipatory guidance, age appropriate vaccines, screening exams, health promotion and prevention discussed.    This document was generated using the assistance of voice recognition software. If there are any errors of spelling, grammar, syntax, or meaning; please feel free to contact me directly for clarification.

## 2023-07-13 ENCOUNTER — TELEPHONE (OUTPATIENT)
Dept: PRIMARY CARE | Facility: CLINIC | Age: 28
End: 2023-07-13
Payer: COMMERCIAL

## 2023-07-13 NOTE — TELEPHONE ENCOUNTER
[place] pharmacy called---Rx for diabetic supplies were sent in    How may times daily does she test ?

## 2023-08-02 LAB
CHLAMYDIA TRACH., AMPLIFIED: NEGATIVE
N. GONORRHEA, AMPLIFIED: NEGATIVE

## 2023-09-05 ENCOUNTER — HOSPITAL ENCOUNTER (OUTPATIENT)
Dept: DATA CONVERSION | Facility: HOSPITAL | Age: 28
End: 2023-09-05
Attending: OBSTETRICS & GYNECOLOGY | Admitting: OBSTETRICS & GYNECOLOGY
Payer: COMMERCIAL

## 2023-09-05 DIAGNOSIS — Z86.001 PERSONAL HISTORY OF IN-SITU NEOPLASM OF CERVIX UTERI: ICD-10-CM

## 2023-09-05 DIAGNOSIS — N93.9 ABNORMAL UTERINE AND VAGINAL BLEEDING, UNSPECIFIED: ICD-10-CM

## 2023-09-05 DIAGNOSIS — N94.6 DYSMENORRHEA, UNSPECIFIED: ICD-10-CM

## 2023-09-05 DIAGNOSIS — N92.1 EXCESSIVE AND FREQUENT MENSTRUATION WITH IRREGULAR CYCLE: ICD-10-CM

## 2023-09-05 DIAGNOSIS — N88.8 OTHER SPECIFIED NONINFLAMMATORY DISORDERS OF CERVIX UTERI: ICD-10-CM

## 2023-09-05 LAB
ANION GAP IN SER/PLAS: 11 MMOL/L (ref 10–20)
CALCIUM (MG/DL) IN SER/PLAS: 9.1 MG/DL (ref 8.6–10.3)
CARBON DIOXIDE, TOTAL (MMOL/L) IN SER/PLAS: 28 MMOL/L (ref 21–32)
CHLORIDE (MMOL/L) IN SER/PLAS: 104 MMOL/L (ref 98–107)
CREATININE (MG/DL) IN SER/PLAS: 0.74 MG/DL (ref 0.5–1.05)
ERYTHROCYTE DISTRIBUTION WIDTH (RATIO) BY AUTOMATED COUNT: 13.7 % (ref 11.5–14.5)
ERYTHROCYTE MEAN CORPUSCULAR HEMOGLOBIN CONCENTRATION (G/DL) BY AUTOMATED: 33 G/DL (ref 32–36)
ERYTHROCYTE MEAN CORPUSCULAR VOLUME (FL) BY AUTOMATED COUNT: 84 FL (ref 80–100)
ERYTHROCYTES (10*6/UL) IN BLOOD BY AUTOMATED COUNT: 5.41 X10E12/L (ref 4–5.2)
GFR FEMALE: >90 ML/MIN/1.73M2
GLUCOSE (MG/DL) IN SER/PLAS: 93 MG/DL (ref 74–99)
HEMATOCRIT (%) IN BLOOD BY AUTOMATED COUNT: 45.4 % (ref 36–46)
HEMOGLOBIN (G/DL) IN BLOOD: 15 G/DL (ref 12–16)
LEUKOCYTES (10*3/UL) IN BLOOD BY AUTOMATED COUNT: 11 X10E9/L (ref 4.4–11.3)
PLATELETS (10*3/UL) IN BLOOD AUTOMATED COUNT: 261 X10E9/L (ref 150–450)
POTASSIUM (MMOL/L) IN SER/PLAS: 3.8 MMOL/L (ref 3.5–5.3)
SODIUM (MMOL/L) IN SER/PLAS: 139 MMOL/L (ref 136–145)
UREA NITROGEN (MG/DL) IN SER/PLAS: 17 MG/DL (ref 6–23)

## 2023-09-07 VITALS — WEIGHT: 132.28 LBS | BODY MASS INDEX: 22.01 KG/M2

## 2023-09-07 LAB — HCG, URINE: NEGATIVE

## 2023-09-14 NOTE — H&P
"    History & Physical Reviewed:   Pregnant/Lactating:  ·  Are You Pregnant no (1)   ·  Are You Currently Breastfeeding no (1)     I have reviewed the History and Physical dated:  07-Apr-2023   History and Physical reviewed and relevant findings noted. Patient examined to review pertinent physical  findings.: No significant changes   Home Medications Reviewed: no changes noted   Allergies Reviewed: no changes noted       ERAS (Enhanced Recovery After Surgery):  ·  ERAS Patient: no     Consent:   COVID-19 Consent:  ·  COVID-19 Risk Consent Surgeon has reviewed key risks related to the risk of zuleika COVID-19 and if they contract COVID-19 what the risks are.       Electronic Signatures:  Marily Singleton)  (Signed 12-Apr-2023 13:33)   Authored: History & Physical Reviewed, ERAS, Consent,  Note Completion      Last Updated: 12-Apr-2023 13:33 by Marily Singleton)    References:  1.  Data Referenced From \"Patient Profile - Preop v3\" 12-Apr-2023 12:09   "

## 2023-09-29 VITALS
BODY MASS INDEX: 22.04 KG/M2 | HEART RATE: 82 BPM | DIASTOLIC BLOOD PRESSURE: 75 MMHG | HEIGHT: 65 IN | TEMPERATURE: 97.9 F | RESPIRATION RATE: 18 BRPM | SYSTOLIC BLOOD PRESSURE: 107 MMHG | WEIGHT: 132.28 LBS

## 2023-09-30 NOTE — H&P
History of Present Illness:   Pregnant/Lactating:  ·  Are You Pregnant no (1)   ·  Are You Currently Breastfeeding no     History Present Illness:  Reason for surgery: 28-year-old with history of abnormal  uterine bleeding and dysmenorrhea here for a hysterectomy   HPI:    28-year-old with history of abnormal uterine bleeding status post prior endometrial ablation and IUD use, currently not controlled with hormonal control, and with  dysmenorrhea, here for a hysterectomy.  Patient with prior TL.  Patient also with prior LORENA-3 treated with LEEP procedure.    Allergies:        Allergies:  ·  amoxicillin : Hives/Urticaria  ·  tramadol : Hives/Urticaria  ·  doxycycline : Hives/Urticaria  ·  naproxen : Hives/Urticaria  ·  clindamycin : Hives/Urticaria  ·  Zofran : Hives/Urticaria  ·  Latex : Rash  ·  Bee  Stings: Swelling/Edema, Hives/Urticaria  ·  Zithromax : Unknown  ·  Ceftin : Unknown  ·  penicillin : Unknown  ·  Tramadol  Hydrochloride: Unknown  ·  Latuda : Unknown  ·  NSAIDs : Unknown    Home Medication Review:   Home Medications Reviewed: yes     Impression/Procedure:   ·  Impression and Planned Procedure: Abnormal uterine bleeding and dysmenorrhea and history of LORENA-3 here for a hysterectomy       ERAS (Enhanced Recovery After Surgery):  ·  ERAS Patient: yes   ·  CPM/PAT Utilization: yes   ·  Immunonutrition Recovery Drink Utilization: no   ·  Carbohydrate Supplement Drink Utilization: no       Vital Signs:  Temperature C: 36.6 degrees C   Temperature F: 97.8 degrees F   Heart Rate: 82 beats per minute   Respiratory Rate: 18 breath per minute   Blood Pressure Systolic: 107 mm/Hg   Blood Pressure Diastolic: 75 mm/Hg     Physical Exam by System:    Constitutional: Patient sitting comfortably in bed   Respiratory/Thorax: Clear to auscultation   Cardiovascular: Regular rate and rhythm   Psychological: Appropriate mood and affect     Consent:   COVID-19 Consent:  ·  COVID-19 Risk Consent Surgeon has reviewed key  "risks related to the risk of zuleika COVID-19 and if they contract COVID-19 what the risks are.       Electronic Signatures:  Tomás Adams)  (Signed 05-Sep-2023 07:24)   Authored: History of Present Illness, Allergies, Home  Medication Review, Impression/Procedure, ERAS, Physical Exam, Consent, Note Completion      Last Updated: 05-Sep-2023 07:24 by Tomás Adams (MD)    References:  1.  Data Referenced From \"Patient Profile - Preop v3\" 05-Sep-2023 06:18   "

## 2023-10-01 NOTE — OP NOTE
PROCEDURE DETAILS    Preoperative Diagnosis:  Irregular intermenstrual bleeding, N92.1  Abnormal uterine and vaginal bleeding, unspecified, N93.9  Dysmenorrhea, unspecified, N94.6    Postoperative Diagnosis:  Irregular intermenstrual bleeding, N92.1  Abnormal uterine and vaginal bleeding, unspecified, N93.9  Dysmenorrhea, unspecified, N94.6    Surgeon: Tomás Adams  Resident/Fellow/Other Assistant: Laurence rGeen    Procedure:  1.  TOTAL LAPAROSCOPIC HYSTERECTOMY,    Anesthesia: No anesthesiologist associated with this case  Estimated Blood Loss: 15 mL  Findings: Normal uterus and ovaries, appendix appeared normal  Specimens(s) Collected: yes,  Uterus   Urine Output: 250 mL        Operative Report:   28-year-old with history of abnormal uterine bleeding and dysmenorrhea status post prior endometrial ablation.  Patient also with prior history of LORENA-3.  Patient  has had a previous TL.    Patient was brought to the operating room where a huddle was performed. Patient was placed under general ET tube anesthesia. Patient was placed in the lithotomy position. Patient was prepped and draped in the usual fashion for a vaginal and abdominal  procedure. A timeout was performed. A weighted speculum was inserted in the vagina, Marrero catheter was inserted and cervix was grasped with a toothed tenaculum. A Micheline 2 manipulator is inserted with a 3.5 centimeter cone and a 6 centimeter tip. The intrauterine  balloon was instilled with saline and the vaginal occluder was instilled with air.  Attention was then turned abdominally where a 5 mm umbilical incision was incised in the skin and through this a Veress needle was inserted. CO2 was instilled into the  abdominal cavity. The Veress needle was removed and under direct visualization a trocar and sheath were inserted. A second 10 mm left lateral incision was cut sharply in the skin and a second trocar and sheath were inserted. A third 5 mm right lateral  incision was  made in the skin and a third trocar and sheath was inserted. The pelvis was inspected with normal uterus and both ovaries.  Tubes were absent.  The appendix and upper abdomen appeared normal.  Both ureters were noted to be peristalsing normally.  The left ovarian ligament and round ligament were then incised with the ligature device. The serosal reflection of the bladder on the uterus was then incised. The bladder was pushed down off the lower uterine segment. The left uterine vessels were then  cauterized and incised. The same procedure was performed on the right side incising the right ovarian ligament and right round ligament followed by the right uterine vessels. Using unipolar cautery the vaginal cuff was incised onto the Micheline cup, this  was performed anteriorly down along the left side posteriorly and up along the right side. The uterus was then removed vaginally. The uterus was replaced into the vagina to maintain the pneumoperitoneum. Attention was then turned abdominally where a fourth  5 mm left lateral incision was sharply incised in the skin and a fourth trocar and sheath was inserted. Hemostasis was obtained from the vaginal cuff with cautery. The vaginal cuff was then closed with a running V lock O absorbable suture. This was run  from left to right and back to the left.  hemostasis was noted be good.  The ureters were again noted be peristalsing normally. All instruments removed. Incisions were repaired with 4-0 Vicryl. All counts were correct at the end of the procedure. EBL  was 15 mL's. Patient was taken recovery room in stable condition.    Dr. Green assisted with the surgery due to the complexity of the case.  She inserted the laparoscope while I inserted the Micheline vaginally.  She  managed the laparoscope, ligated the right ovarian ligament, round ligament, and uterine vessels, and assisted with suturing.  Her assistance was necessary for completion of this  case.                        Attestation:   Note Completion:  Attending Attestation I performed the procedure without a resident         Electronic Signatures:  Tomás Adams)  (Signed 05-Sep-2023 08:42)   Authored: Post-Operative Note, Chart Review, Note Completion      Last Updated: 05-Sep-2023 08:42 by Tomás Adams)

## 2023-10-02 NOTE — OP NOTE
Post Operative Note:     PreOp Diagnosis: Menorrhagia, IUD in place, encounter  for female  tubal sterilization   Post-Procedure Diagnosis: Menorrhagia, IUD in place,  encounter for female tubal sterilization   Procedure: 1. Mirena IUD removal   2. Laparoscopic BS  3. hysteroscopy D & C  4. TRACI endometrial ablation  5.   Surgeon: Marily Singleton MD   Resident/Fellow/Other Assistant: Luan Flores   Anesthesia: General   I.V. Fluids: 1000 ml   Estimated Blood Loss (mL): 2   Specimen: yes. Complete left tube an d partial right  tube , EMC   Complications: None   Findings: Normal appearing uterus and bilateral ovaries  and left tube, Partial right tube noted due to previous H/o partial salpingectomy   Patient Returned To/Condition: PACU/Stable   Urine Output: 300 ml     Operative Report Dictated:  Dictation: not applicable - note contains Operative  Report   Note Recipients: Shellie Lino, YOLANDA-CNP - [id number]  []   Operative Report:     The patient was brought to the operating room where compression stockings were applied and adequate general anesthesia was obtained. Timeout procedure was completed.  She was placed in lithotomy position and iodine vaginal and perineal prep was performed. A weighted speculum was placed in the vagina. The anterior lip of the cervix was grasped with a tenaculum.   IUD string was noted, grasped and mirena IUD was removed. The cervix was progressively dilated using cervical dilators and a Hulka intrauterine manipulator was inserted. Cervical tenaculum was removed and weighted speculum was also removed. Next attention  was turned to the abdomen. A small infraumbilical incision was made and veris  needle was inserted at a 45° angle and intra-abdominal placement was confirmed with a fluid filled syringe, opening pressure was 7 mmHg. CO2 gas was then insufflated till  the abdomen is tympanitic. Veris  needle was then removed and a 5 mm trocar and cannula was inserted through the same  incision. Intra-abdominal placement was confirmed with the laparoscope. 2 other incisions were made in the right and left lower quadrants  8 cm above the symphysis pubis and 8 cm lateral to the infraumbilical incision. Through the right lower quadrant incision a 5 mm trocar and cannula was inserted under direct visualization. To the left lower quadrant incision and 8 mm trocar and cannula  was inserted under direct visualization. Patient was placed in steep Trendelenburg position. Thorough evaluation of the pelvic cavity reveals visualization of both ureters with good peristalsis. Normal-appearing uterus with bilateral ovaries also appearing  normal was noted. Left tube appeared normal . Right tube noted to be missing the lateral half due to previous H/o partial salpingectomy. Using the 5 mm LigaSure device bilateral salpingectomy was performed by transecting the mesosalpinges on both sides.  Good hemostasis was noted and the procedure was terminated. All trocars were removed under direct visualization after evacuation of carbon dioxide gas. The trocar sites were repaired with subcuticular stitches of 4-0 Vicryl.    Attention was turned to the vaginal area. Hulka manipulator was removed and single toothed tenaculum was used to grasp the  anterior lip of the cervix.  The uterus was sounded to 8 cm. The cervix was serially dilated with cervical dilators. The 5 mm hysteroscope  was then inserted and intrauterine placement confirmed. The cervical canal, endometrial cavity and both tubal ostia  were visualized. No abnormal lesions were noted in the uterine cavity  The hysteroscope was then removed. Sharp curettage was then performed with endometrial tissue obtained and sent to pathology. Endometrial curettings were sent to pathology.  TRACI ENDOMETRIAL ABLATION  The effective cavity length was 5 cm. This was entered into the array device. Array device was inserted into the endometrial cavity and deployed and cervical  balloon was distended.   TRACI endometrial ablation was performed with usual settings and for a duration of 120 seconds. The hysteroscope was reinserted and  endometrial cavity appeared well ablated. Good hemostasis was noted. The hysteroscope was removed.  Tenaculum was removed from the cervix.  No active bleeding was noted. All instruments were removed from the vagina. Sponge lap and instrument counts were correct x 2. Patient tolerated the procedure well. The patient was taken in stable condition to recovery      Attestation:   Note Completion:  Attending Attestation I performed the procedure without a resident         Electronic Signatures:  Marily Singleton)  (Signed 12-Apr-2023 15:17)   Authored: Post Operative Note, Note Completion      Last Updated: 12-Apr-2023 15:17 by Marily Singleton)

## 2023-10-20 ENCOUNTER — TELEPHONE (OUTPATIENT)
Dept: OBSTETRICS AND GYNECOLOGY | Facility: CLINIC | Age: 28
End: 2023-10-20
Payer: COMMERCIAL

## 2023-10-20 NOTE — TELEPHONE ENCOUNTER
Pt wants to discuss still having stitches in her abdomen from her surgery. Please call her ay [phone]

## 2023-10-20 NOTE — TELEPHONE ENCOUNTER
I spoke with pt, she is 6w3d out from surgery, she states she still has some stitches in her laparoscopic incisions that are sticking out some. I reviewed with her that 6 weeks is average time for stitches to heal so they should fall out soon. Encouraged her to try to pull them out herself but to stop if it causes pain. Instructed her to call us in 1 week if stitches still present. Pt agreeable to plan

## 2023-10-28 PROBLEM — Z48.89 POSTOPERATIVE VISIT: Status: ACTIVE | Noted: 2023-10-28

## 2023-10-30 ENCOUNTER — APPOINTMENT (OUTPATIENT)
Dept: OBSTETRICS AND GYNECOLOGY | Facility: CLINIC | Age: 28
End: 2023-10-30
Payer: COMMERCIAL

## 2023-12-22 ENCOUNTER — HOSPITAL ENCOUNTER (EMERGENCY)
Facility: HOSPITAL | Age: 28
Discharge: LEFT BEFORE DISCHARGE | End: 2023-12-22
Payer: COMMERCIAL

## 2023-12-22 VITALS
BODY MASS INDEX: 22.49 KG/M2 | HEART RATE: 99 BPM | OXYGEN SATURATION: 96 % | WEIGHT: 135 LBS | DIASTOLIC BLOOD PRESSURE: 75 MMHG | TEMPERATURE: 98.4 F | SYSTOLIC BLOOD PRESSURE: 123 MMHG | HEIGHT: 65 IN | RESPIRATION RATE: 16 BRPM

## 2023-12-22 LAB
FLUAV RNA RESP QL NAA+PROBE: NOT DETECTED
FLUBV RNA RESP QL NAA+PROBE: NOT DETECTED
SARS-COV-2 RNA RESP QL NAA+PROBE: NOT DETECTED

## 2023-12-22 PROCEDURE — 87636 SARSCOV2 & INF A&B AMP PRB: CPT | Performed by: EMERGENCY MEDICINE

## 2023-12-22 PROCEDURE — 99283 EMERGENCY DEPT VISIT LOW MDM: CPT

## 2023-12-22 PROCEDURE — 99281 EMR DPT VST MAYX REQ PHY/QHP: CPT

## 2023-12-22 ASSESSMENT — COLUMBIA-SUICIDE SEVERITY RATING SCALE - C-SSRS
6. HAVE YOU EVER DONE ANYTHING, STARTED TO DO ANYTHING, OR PREPARED TO DO ANYTHING TO END YOUR LIFE?: NO
1. IN THE PAST MONTH, HAVE YOU WISHED YOU WERE DEAD OR WISHED YOU COULD GO TO SLEEP AND NOT WAKE UP?: NO
2. HAVE YOU ACTUALLY HAD ANY THOUGHTS OF KILLING YOURSELF?: NO

## 2023-12-29 ENCOUNTER — TELEPHONE (OUTPATIENT)
Dept: PRIMARY CARE | Facility: CLINIC | Age: 28
End: 2023-12-29
Payer: COMMERCIAL

## 2023-12-29 DIAGNOSIS — R05.9 COUGH, UNSPECIFIED TYPE: Primary | ICD-10-CM

## 2023-12-29 RX ORDER — BENZONATATE 100 MG/1
100 CAPSULE ORAL 3 TIMES DAILY PRN
Qty: 42 CAPSULE | Refills: 0 | Status: SHIPPED | OUTPATIENT
Start: 2023-12-29 | End: 2024-01-28

## 2023-12-29 NOTE — TELEPHONE ENCOUNTER
Former [facility] patient.    She has a cough she can't get rid of.  She would like something to help.  She had covid 11/7/23.  The cough just keeps hanging on.  She would like a sooner appointment.  Emma? Usually helps.  [facility] in [place]

## 2023-12-29 NOTE — TELEPHONE ENCOUNTER
Can't try Tessalon as prescribed. Ok to find an acute visit with myself or any provider that has openings to address cough. Thank you!

## 2023-12-31 ENCOUNTER — HOSPITAL ENCOUNTER (OUTPATIENT)
Dept: CARDIOLOGY | Facility: HOSPITAL | Age: 28
Discharge: HOME | End: 2023-12-31
Payer: COMMERCIAL

## 2023-12-31 ENCOUNTER — APPOINTMENT (OUTPATIENT)
Dept: RADIOLOGY | Facility: HOSPITAL | Age: 28
End: 2023-12-31
Payer: COMMERCIAL

## 2023-12-31 ENCOUNTER — HOSPITAL ENCOUNTER (EMERGENCY)
Facility: HOSPITAL | Age: 28
Discharge: HOME | End: 2023-12-31
Payer: COMMERCIAL

## 2023-12-31 VITALS
HEART RATE: 94 BPM | OXYGEN SATURATION: 97 % | WEIGHT: 135 LBS | SYSTOLIC BLOOD PRESSURE: 118 MMHG | RESPIRATION RATE: 16 BRPM | DIASTOLIC BLOOD PRESSURE: 79 MMHG | HEIGHT: 65 IN | TEMPERATURE: 98 F | BODY MASS INDEX: 22.49 KG/M2

## 2023-12-31 DIAGNOSIS — J21.0 RSV (ACUTE BRONCHIOLITIS DUE TO RESPIRATORY SYNCYTIAL VIRUS): Primary | ICD-10-CM

## 2023-12-31 LAB
FLUAV RNA RESP QL NAA+PROBE: NOT DETECTED
FLUBV RNA RESP QL NAA+PROBE: NOT DETECTED
RSV RNA RESP QL NAA+PROBE: DETECTED
SARS-COV-2 RNA RESP QL NAA+PROBE: NOT DETECTED

## 2023-12-31 PROCEDURE — 71046 X-RAY EXAM CHEST 2 VIEWS: CPT | Mod: FR

## 2023-12-31 PROCEDURE — 93005 ELECTROCARDIOGRAM TRACING: CPT

## 2023-12-31 PROCEDURE — 71046 X-RAY EXAM CHEST 2 VIEWS: CPT | Mod: FOREIGN READ | Performed by: RADIOLOGY

## 2023-12-31 PROCEDURE — 87637 SARSCOV2&INF A&B&RSV AMP PRB: CPT | Performed by: NURSE PRACTITIONER

## 2023-12-31 PROCEDURE — 99283 EMERGENCY DEPT VISIT LOW MDM: CPT

## 2023-12-31 ASSESSMENT — COLUMBIA-SUICIDE SEVERITY RATING SCALE - C-SSRS
2. HAVE YOU ACTUALLY HAD ANY THOUGHTS OF KILLING YOURSELF?: NO
6. HAVE YOU EVER DONE ANYTHING, STARTED TO DO ANYTHING, OR PREPARED TO DO ANYTHING TO END YOUR LIFE?: NO
1. IN THE PAST MONTH, HAVE YOU WISHED YOU WERE DEAD OR WISHED YOU COULD GO TO SLEEP AND NOT WAKE UP?: NO

## 2023-12-31 ASSESSMENT — PAIN DESCRIPTION - LOCATION: LOCATION: RIB CAGE

## 2023-12-31 ASSESSMENT — PAIN - FUNCTIONAL ASSESSMENT: PAIN_FUNCTIONAL_ASSESSMENT: 0-10

## 2023-12-31 NOTE — ED PROVIDER NOTES
HPI   Chief Complaint   Patient presents with   • cough, rsv contact       This is a 28-year-old  female presenting to the emergency room with a cough and concern for RSV exposure.  The patient states that she has been dealing with mostly nonproductive cough for the past week.  Her father was recently diagnosed with RSV.  She reports that she has pain and pressure in her ears and she states that her chest hurts.  The patient states she has not had any fevers. She denies any abdominal pain or alteration in her urine or bowel function.  She is not exhibiting any rash.  Patient has been taking over-the-counter medication with no relief of her symptoms.                          Hoffman Coma Scale Score: 15                  Patient History   Past Medical History:   Diagnosis Date   • 36 weeks gestation of pregnancy 07/07/2022    36 weeks gestation of pregnancy   • Allergic rhinitis 07/11/2023   • Cigarette nicotine dependence without complication 07/11/2023   • Encounter for supervision of normal pregnancy, unspecified, first trimester 01/05/2022    Encounter for pregnancy related examination, first trimester   • Encounter for supervision of other normal pregnancy, second trimester 05/05/2022    Multigravida in second trimester   • Encounter for supervision of other normal pregnancy, third trimester 07/21/2022    Multigravida in third trimester   • Encounter for surveillance of injectable contraceptive 10/20/2022    Encounter for surveillance of injectable contraceptive   • Hemorrhage in early pregnancy, unspecified 01/20/2022    Bleeding in early pregnancy   • Other specified pregnancy related conditions, unspecified trimester 03/21/2022    Pelvic cramping in antepartum period   • Other specified symptoms and signs involving the circulatory and respiratory systems 08/15/2022    Chest congestion   • Personal history of other diseases of the female genital tract 07/26/2021    History of amenorrhea   • Personal  history of other diseases of the female genital tract 08/30/2021    History of abnormal uterine bleeding   • Personal history of other mental and behavioral disorders     History of anxiety     Past Surgical History:   Procedure Laterality Date   • OTHER SURGICAL HISTORY  09/28/2020    Loop electrosurgical excision procedure     No family history on file.  Social History     Tobacco Use   • Smoking status: Every Day     Types: Cigarettes   • Smokeless tobacco: Never   Substance Use Topics   • Alcohol use: Never   • Drug use: Never       Physical Exam   ED Triage Vitals [12/31/23 1206]   Temp Heart Rate Resp BP   36.7 °C (98 °F) 94 16 118/79      SpO2 Temp src Heart Rate Source Patient Position   97 % -- -- --      BP Location FiO2 (%)     -- --       Physical Exam  Vitals and nursing note reviewed.   Constitutional:       Appearance: Normal appearance. She is normal weight.   HENT:      Head: Normocephalic and atraumatic.      Right Ear: Tympanic membrane and external ear normal.      Left Ear: Tympanic membrane and external ear normal.      Nose: Congestion and rhinorrhea present.      Mouth/Throat:      Mouth: Mucous membranes are moist.      Pharynx: Oropharynx is clear.   Eyes:      Extraocular Movements: Extraocular movements intact.      Conjunctiva/sclera: Conjunctivae normal.      Pupils: Pupils are equal, round, and reactive to light.   Cardiovascular:      Rate and Rhythm: Normal rate and regular rhythm.      Pulses: Normal pulses.   Pulmonary:      Effort: Pulmonary effort is normal.      Breath sounds: Examination of the right-middle field reveals rhonchi. Examination of the right-lower field reveals rhonchi. Rhonchi present.   Abdominal:      General: Abdomen is flat. Bowel sounds are normal.      Palpations: Abdomen is soft.   Genitourinary:     Comments: No CVA tenderness or pubic pain.  Musculoskeletal:         General: Normal range of motion.   Skin:     General: Skin is warm and dry.      Capillary  Refill: Capillary refill takes less than 2 seconds.   Neurological:      General: No focal deficit present.      Mental Status: She is alert and oriented to person, place, and time.   Psychiatric:         Mood and Affect: Mood normal.         Judgment: Judgment normal.         ED Course & MDM   ED Course as of 12/31/23 1420   Sun Dec 31, 2023   1420 Twelve-lead EKG  Interpreted by me  Sinus rhythm at a rate of 88.  ME interval is 146, QRS is 90, QT is 373, QTc is 452.  Normal axis.  With multiple PVCs.  No acute ischemia or injury pattern noted. [CT]      ED Course User Index  [CT] ROYCE Siddiqui         Diagnoses as of 12/31/23 1420   RSV (acute bronchiolitis due to respiratory syncytial virus)       Medical Decision Making  The patient was seen and evaluated by the nurse practitioner, Eboni Mcmillan.  The patient is presenting to the emergency room with complaints of flulike symptoms.  The patient has had exposure to RSV.  It is likely that the patient also has RSV.  The patient's vital signs were stable and she was nontoxic in appearance.  The patient did not have any evidence of otitis media, sinusitis, pharyngitis, or meningitis.  RSV, COVID, and influenza swabs were obtained and the patient tested positive for RSV.  An x-ray of the chest was performed and showed no acute cardiopulmonary process.  The patient was notified of her imaging and laboratory results.  The patient was advised to provide symptomatic care.  The patient is to follow up with their primary care physician in the next 2-3 days.  The patient is to return to the ED worse in any way.  The patient was discharged in stable condition with computer discharge instructions given. Patient was agreeable with discharge planning.          Procedure  Procedures     ROYCE Siddiqui  12/31/23 1407       ROYCE Siddiqui  12/31/23 1420

## 2024-01-10 LAB
ATRIAL RATE: 87 BPM
P AXIS: 34 DEGREES
PR INTERVAL: 146 MS
Q ONSET: 251 MS
QRS COUNT: 14 BEATS
QRS DURATION: 90 MS
QT INTERVAL: 373 MS
QTC CALCULATION(BAZETT): 452 MS
QTC FREDERICIA: 423 MS
R AXIS: -19 DEGREES
T AXIS: -4 DEGREES
T OFFSET: 438 MS
VENTRICULAR RATE: 88 BPM

## 2024-01-31 DIAGNOSIS — Z91.030 BEE ALLERGY STATUS: ICD-10-CM

## 2024-01-31 DIAGNOSIS — K21.9 GASTROESOPHAGEAL REFLUX DISEASE WITHOUT ESOPHAGITIS: ICD-10-CM

## 2024-01-31 DIAGNOSIS — R73.01 ELEVATED FASTING BLOOD SUGAR: ICD-10-CM

## 2024-01-31 RX ORDER — LANCETS
EACH MISCELLANEOUS
Qty: 100 EACH | Refills: 3 | Status: SHIPPED | OUTPATIENT
Start: 2024-01-31 | End: 2024-05-15 | Stop reason: SDUPTHER

## 2024-01-31 RX ORDER — EPINEPHRINE 0.3 MG/.3ML
INJECTION SUBCUTANEOUS
Qty: 2 EACH | Refills: 0 | Status: SHIPPED | OUTPATIENT
Start: 2024-01-31 | End: 2024-05-15 | Stop reason: SDUPTHER

## 2024-01-31 RX ORDER — CALCIUM CITRATE/VITAMIN D3 200MG-6.25
TABLET ORAL
Qty: 100 STRIP | Refills: 0 | Status: CANCELLED | OUTPATIENT
Start: 2024-01-31

## 2024-01-31 RX ORDER — CALCIUM CITRATE/VITAMIN D3 200MG-6.25
TABLET ORAL
Qty: 100 STRIP | Refills: 0 | Status: SHIPPED | OUTPATIENT
Start: 2024-01-31

## 2024-01-31 RX ORDER — EPINEPHRINE 0.3 MG/.3ML
INJECTION SUBCUTANEOUS
Qty: 2 EACH | Refills: 0 | Status: CANCELLED | OUTPATIENT
Start: 2024-01-31

## 2024-01-31 RX ORDER — OMEPRAZOLE 40 MG/1
40 CAPSULE, DELAYED RELEASE ORAL DAILY
Qty: 90 CAPSULE | Refills: 2 | Status: SHIPPED | OUTPATIENT
Start: 2024-01-31

## 2024-01-31 RX ORDER — LANCETS
EACH MISCELLANEOUS
Qty: 100 EACH | Refills: 3 | Status: CANCELLED | OUTPATIENT
Start: 2024-01-31

## 2024-01-31 RX ORDER — OMEPRAZOLE 40 MG/1
40 CAPSULE, DELAYED RELEASE ORAL DAILY
Qty: 90 CAPSULE | Refills: 2 | Status: CANCELLED | OUTPATIENT
Start: 2024-01-31

## 2024-01-31 NOTE — TELEPHONE ENCOUNTER
Rx Refill Request Telephone Encounter    Name:  Cari Reina  :  [id number]  Medication Name:  True Metrix Glucose Test Strip        100     Specific Pharmacy location:  [facility]  Rx Refill Request Telephone Encounter    Name:  Cari Reina  :  [id number]  Medication Name:  Oxycodone  5 mg        Take 1 tablet by mouth every 6 hours as needed for breakthrough pain  Rx Refill Request Telephone Encounter    Name:  Cari Reina  :  [id number]  Medication Name:  Omeprazole  40 mg          Rx Refill Request Telephone Encounter    Name:  Cari Reina  :  [id number]  Medication Name:  Lancets        100    Rx Refill Request Telephone Encounter    Name:  Cari Reina  :  [id number]  Medication Name:  Epinephrine  0.3 mg

## 2024-01-31 NOTE — TELEPHONE ENCOUNTER
Michelle Betancourt, APRN-CHANELL Lopez, [facility]  Caller: Unspecified (Today,  1:54 PM)  Oxycodone is not an appropriate refill and has never been prescribed by anyone here in the office.

## 2024-01-31 NOTE — TELEPHONE ENCOUNTER
Patient Education     Self-Care for Sore Throats    Sore throats happen for many reasons, such as colds, allergies, and infections caused by viruses or bacteria. In any case, your throat becomes red and sore. Your goal for self-care is to reduce your discomfort while giving your throat a chance to heal.  Moisten and soothe your throat  Tips include the following:  · Try a sip of water first thing after waking up.  · Keep your throat moist by drinking 6 or more glasses of clear liquids every day.  · Run a cool-air humidifier in your room overnight.  · Avoid cigarette smoke.   · Suck on throat lozenges, cough drops, hard candy, ice chips, or frozen fruit-juice bars. Use the sugar-free versions if your diet or medical condition requires them.  Gargle to ease irritation  Gargling every hour or 2 can ease irritation. Try gargling with 1 of these solutions:  · 1/4 teaspoon of salt in 1/2 cup of warm water  · An over-the-counter anesthetic gargle  Use medicine for more relief  Over-the-counter medicine can reduce sore throat symptoms. Ask your pharmacist if you have questions about which medicine to use:  · Ease pain with anesthetic sprays. Aspirin or an aspirin substitute also helps. Remember, never give aspirin to anyone 18 or younger, or if you are already taking blood thinners.   · For sore throats caused by allergies, try antihistamines to block the allergic reaction.  · Remember: unless a sore throat is caused by a bacterial infection, antibiotics won’t help you.  Prevent future sore throats  Prevention tips include the following:  · Stop smoking or reduce contact with secondhand smoke. Smoke irritates the tender throat lining.  · Limit contact with pets and with allergy-causing substances, such as pollen and mold.  · When you’re around someone with a sore throat or cold, wash your hands often to keep viruses or bacteria from spreading.  · Don’t strain your vocal cords.  Call your healthcare provider  Contact your  I do not see a contract or uds on file. Last seen by REED 7/12/2023. Everything pending except oxycodone    healthcare provider if you have:  · A temperature over 101°F (38.3°C)  · White spots on the throat  · Great difficulty swallowing  · Trouble breathing  · A skin rash  · Recent exposure to someone else with strep bacteria  · Severe hoarseness and swollen glands in the neck or jaw   Date Last Reviewed: 8/1/2016  © 2660-7614 [employer]. [address] [place] [address]. All rights reserved. This information is not intended as a substitute for professional medical care. Always follow your healthcare professional's instructions.           Patient Education     Viral Upper Respiratory Illness (Adult)  You have a viral upper respiratory illness (URI), which is another term for the common cold. This illness is contagious during the first few days. It is spread through the air by coughing and sneezing. It may also be spread by direct contact (touching the sick person and then touching your own eyes, nose, or mouth). Frequent handwashing will decrease risk of spread. Most viral illnesses go away within 7 to 10 days with rest and simple home remedies. Sometimes the illness may last for several weeks. Antibiotics will not kill a virus, and they are generally not prescribed for this condition.    Home care  · If symptoms are severe, rest at home for the first 2 to 3 days. When you resume activity, don't let yourself get too tired.  · Avoid being exposed to cigarette smoke (yours or others’).  · You may use acetaminophen or ibuprofen to control pain and fever, unless another medicine was prescribed. If you have chronic liver or kidney disease, have ever had a stomach ulcer or gastrointestinal bleeding, or are taking blood-thinning medicines, talk with your healthcare provider before using these medicines. Aspirin should never be given to anyone under 18 years of age who is ill with a viral infection or fever. It may cause severe liver or brain damage.  · Your appetite may be poor, so a light diet is fine.  Avoid dehydration by drinking 6 to 8 glasses of fluids per day (water, soft drinks, juices, tea, or soup). Extra fluids will help loosen secretions in the nose and lungs.  · Over-the-counter cold medicines will not shorten the length of time you’re sick, but they may be helpful for the following symptoms: cough, sore throat, and nasal and sinus congestion. (Note: Do not use decongestants if you have high blood pressure.)  Follow-up care  Follow up with your healthcare provider, or as advised.  When to seek medical advice  Call your healthcare provider right away if any of these occur:  · Cough with lots of colored sputum (mucus)  · Severe headache; face, neck, or ear pain  · Difficulty swallowing due to throat pain  · Fever of 100.4°F (38°C) or higher, or as directed by your healthcare provider  Call 911  Call 911 if any of these occur:  · Chest pain, shortness of breath, wheezing, or difficulty breathing  · Coughing up blood  · Inability to swallow due to throat pain  Date Last Reviewed: 9/13/2015 © 2000-2018 [employer]. [address] [place] [address]. All rights reserved. This information is not intended as a substitute for professional medical care. Always follow your healthcare professional's instructions.

## 2024-05-03 ENCOUNTER — HOSPITAL ENCOUNTER (EMERGENCY)
Facility: HOSPITAL | Age: 29
Discharge: HOME | End: 2024-05-03
Payer: COMMERCIAL

## 2024-05-03 VITALS
OXYGEN SATURATION: 98 % | HEIGHT: 65 IN | SYSTOLIC BLOOD PRESSURE: 111 MMHG | BODY MASS INDEX: 21.66 KG/M2 | WEIGHT: 130 LBS | HEART RATE: 96 BPM | DIASTOLIC BLOOD PRESSURE: 74 MMHG | RESPIRATION RATE: 20 BRPM | TEMPERATURE: 98 F

## 2024-05-03 DIAGNOSIS — L25.9 CONTACT DERMATITIS, UNSPECIFIED CONTACT DERMATITIS TYPE, UNSPECIFIED TRIGGER: Primary | ICD-10-CM

## 2024-05-03 DIAGNOSIS — L55.9 SUNBURN: ICD-10-CM

## 2024-05-03 PROCEDURE — 99283 EMERGENCY DEPT VISIT LOW MDM: CPT

## 2024-05-03 RX ORDER — HYDROXYZINE HYDROCHLORIDE 25 MG/1
25 TABLET, FILM COATED ORAL EVERY 6 HOURS
Qty: 12 TABLET | Refills: 0 | Status: SHIPPED | OUTPATIENT
Start: 2024-05-03 | End: 2024-05-06

## 2024-05-03 RX ORDER — PREDNISONE 20 MG/1
40 TABLET ORAL DAILY
Qty: 10 TABLET | Refills: 0 | Status: SHIPPED | OUTPATIENT
Start: 2024-05-03 | End: 2024-05-08

## 2024-05-03 RX ORDER — FAMOTIDINE 20 MG/1
20 TABLET, FILM COATED ORAL 2 TIMES DAILY
Qty: 10 TABLET | Refills: 0 | Status: SHIPPED | OUTPATIENT
Start: 2024-05-03 | End: 2024-05-08

## 2024-05-03 ASSESSMENT — PAIN DESCRIPTION - LOCATION: LOCATION: BACK

## 2024-05-03 ASSESSMENT — LIFESTYLE VARIABLES
HAVE YOU EVER FELT YOU SHOULD CUT DOWN ON YOUR DRINKING: NO
EVER HAD A DRINK FIRST THING IN THE MORNING TO STEADY YOUR NERVES TO GET RID OF A HANGOVER: NO
HAVE PEOPLE ANNOYED YOU BY CRITICIZING YOUR DRINKING: NO
TOTAL SCORE: 0
EVER FELT BAD OR GUILTY ABOUT YOUR DRINKING: NO

## 2024-05-03 ASSESSMENT — PAIN DESCRIPTION - PAIN TYPE: TYPE: ACUTE PAIN

## 2024-05-03 ASSESSMENT — PAIN - FUNCTIONAL ASSESSMENT: PAIN_FUNCTIONAL_ASSESSMENT: 0-10

## 2024-05-03 ASSESSMENT — PAIN SCALES - GENERAL: PAINLEVEL_OUTOF10: 2

## 2024-05-03 NOTE — ED PROVIDER NOTES
HPI   Chief Complaint   Patient presents with   • Rash     C/O rash and sun exposure that started yesterday.       This is a 28-year-old  female presenting to the emergency room with complaints of a rash to her back.  The patient was lounging outside in the sun.  She states that she was laying on you patio furniture.  The patient reported that she felt itchy on her back.  Her friend looked at her back and noted that she had hives.  The hives have improved but she still feels itchy all over.  She denies any new medications, lotions, detergents, or foods.  She is not having any difficulties breathing or swallowing.  Denies any swelling of her tongue or lips.      History provided by:  Patient   used: No                        No data recorded                   Patient History   Past Medical History:   Diagnosis Date   • 36 weeks gestation of pregnancy ([facility]) 07/07/2022    36 weeks gestation of pregnancy   • Allergic rhinitis 07/11/2023   • Cigarette nicotine dependence without complication 07/11/2023   • Encounter for supervision of normal pregnancy, unspecified, first trimester ([facility]) 01/05/2022    Encounter for pregnancy related examination, first trimester   • Encounter for supervision of other normal pregnancy, second trimester ([facility]) 05/05/2022    Multigravida in second trimester   • Encounter for supervision of other normal pregnancy, third trimester ([facility]) 07/21/2022    Multigravida in third trimester   • Encounter for surveillance of injectable contraceptive 10/20/2022    Encounter for surveillance of injectable contraceptive   • Hemorrhage in early pregnancy, unspecified ([facility]) 01/20/2022    Bleeding in early pregnancy   • Other specified pregnancy related conditions, unspecified trimester ([facility]) 03/21/2022    Pelvic cramping in antepartum period   • Other specified symptoms and signs involving the circulatory and respiratory systems 08/15/2022    Chest  congestion   • Personal history of other diseases of the female genital tract 07/26/2021    History of amenorrhea   • Personal history of other diseases of the female genital tract 08/30/2021    History of abnormal uterine bleeding   • Personal history of other mental and behavioral disorders     History of anxiety     Past Surgical History:   Procedure Laterality Date   • OTHER SURGICAL HISTORY  09/28/2020    Loop electrosurgical excision procedure     No family history on file.  Social History     Tobacco Use   • Smoking status: Every Day     Types: Cigarettes   • Smokeless tobacco: Never   Substance Use Topics   • Alcohol use: Never   • Drug use: Never       Physical Exam   ED Triage Vitals [05/03/24 1804]   Temperature Heart Rate Respirations BP   36.7 °C (98 °F) 96 20 111/74      Pulse Ox Temp Source Heart Rate Source Patient Position   98 % Tympanic -- Sitting      BP Location FiO2 (%)     Left arm 21 %       Physical Exam  Vitals and nursing note reviewed.   HENT:      Head: Normocephalic.      Right Ear: External ear normal.      Left Ear: External ear normal.      Nose: Nose normal.      Mouth/Throat:      Pharynx: Oropharynx is clear.   Eyes:      Conjunctiva/sclera: Conjunctivae normal.   Cardiovascular:      Rate and Rhythm: Normal rate and regular rhythm.      Pulses: Normal pulses.      Heart sounds: Normal heart sounds.   Pulmonary:      Effort: Pulmonary effort is normal.      Breath sounds: Normal breath sounds.   Abdominal:      General: Bowel sounds are normal.      Palpations: Abdomen is soft.   Musculoskeletal:         General: Normal range of motion.   Skin:     General: Skin is warm.      Capillary Refill: Capillary refill takes less than 2 seconds.      Comments: The patient has edematous lesions consistent with urticaria noted to her upper back.  The patient also has a first-degree sunburn noted to the upper back and shoulders.   Neurological:      General: No focal deficit present.       Mental Status: She is alert.   Psychiatric:         Mood and Affect: Mood normal.         ED Course & MDM   Diagnoses as of 05/03/24 1903   Contact dermatitis, unspecified contact dermatitis type, unspecified trigger   Sunburn       Medical Decision Making  The patient was seen by the nurse practitioner, Eboni Mcmillan.  The patient is presenting to the emergency room with complaints of a rash.  Patient does not have any obvious signs of anaphylaxis.  The patient's vital signs are stable and she does not appear to be in any acute distress.  The patient was examined and noted to have [place] area noted to her upper back as well as a first-degree sunburn.  The patient was advised to provide symptomatic care for the sunburn.  She is to use naproxen and apply aloe to the affected areas.  The patient was provided a prescription for Pepcid, prednisone, and Atarax for home administration.  She is to return if she has any worsening symptomatology.  The patient was discharged in stable condition with computer discharge instructions given.        Procedure  Procedures     YOLANDA Siddiqui-VESTA  05/03/24 1903

## 2024-05-15 ENCOUNTER — TELEPHONE (OUTPATIENT)
Dept: PRIMARY CARE | Facility: CLINIC | Age: 29
End: 2024-05-15
Payer: COMMERCIAL

## 2024-05-15 DIAGNOSIS — J45.40 MODERATE PERSISTENT ASTHMA, UNSPECIFIED WHETHER COMPLICATED (HHS-HCC): ICD-10-CM

## 2024-05-15 DIAGNOSIS — Z91.030 BEE ALLERGY STATUS: ICD-10-CM

## 2024-05-15 DIAGNOSIS — M54.9 BACK PAIN, UNSPECIFIED BACK LOCATION, UNSPECIFIED BACK PAIN LATERALITY, UNSPECIFIED CHRONICITY: ICD-10-CM

## 2024-05-15 DIAGNOSIS — R73.01 ELEVATED FASTING BLOOD SUGAR: ICD-10-CM

## 2024-05-15 RX ORDER — CYCLOBENZAPRINE HCL 10 MG
10 TABLET ORAL 2 TIMES DAILY PRN
Qty: 30 TABLET | Refills: 0 | Status: SHIPPED | OUTPATIENT
Start: 2024-05-15

## 2024-05-15 RX ORDER — ALBUTEROL SULFATE 90 UG/1
2 AEROSOL, METERED RESPIRATORY (INHALATION) EVERY 4 HOURS PRN
Qty: 6.7 G | Refills: 0 | Status: SHIPPED | OUTPATIENT
Start: 2024-05-15 | End: 2025-05-15

## 2024-05-15 RX ORDER — EPINEPHRINE 0.3 MG/.3ML
INJECTION SUBCUTANEOUS
Qty: 2 EACH | Refills: 0 | Status: SHIPPED | OUTPATIENT
Start: 2024-05-15

## 2024-05-15 RX ORDER — ALBUTEROL SULFATE 0.83 MG/ML
SOLUTION RESPIRATORY (INHALATION)
Qty: 75 ML | Refills: 0 | Status: SHIPPED | OUTPATIENT
Start: 2024-05-15

## 2024-05-15 RX ORDER — LANCETS
EACH MISCELLANEOUS
Qty: 100 EACH | Refills: 3 | Status: SHIPPED | OUTPATIENT
Start: 2024-05-15

## 2024-05-15 NOTE — TELEPHONE ENCOUNTER
Flexeril 10 mg tablet Take 1 tablet (10 mg) by mouth 2 times a day as needed for muscle spasms.     Albuterol inhaler     Albuterol nebulizer solution     Epi pen     True Metrix Test Strips    Lancets     Oxycodone 5 mg    Rash on tailbone, red and itchy on and off for a couple of weeks, she was prescribed Prednisone, Pepcid and Atarax and that helped for a bit but it came back.     [facility]

## 2024-05-15 NOTE — TELEPHONE ENCOUNTER
[facility] was not prescribing her Oxycodone. Will not refill medication.   Would recommend Acute visit for rash evaluation, if Geovanni can see her sooner.   Other medications refilled.

## 2024-05-23 ENCOUNTER — APPOINTMENT (OUTPATIENT)
Dept: PRIMARY CARE | Facility: CLINIC | Age: 29
End: 2024-05-23
Payer: COMMERCIAL

## 2024-07-15 ENCOUNTER — APPOINTMENT (OUTPATIENT)
Dept: PRIMARY CARE | Facility: CLINIC | Age: 29
End: 2024-07-15
Payer: COMMERCIAL

## 2024-07-15 ENCOUNTER — TELEPHONE (OUTPATIENT)
Dept: PRIMARY CARE | Facility: CLINIC | Age: 29
End: 2024-07-15

## 2024-07-15 DIAGNOSIS — K31.89 STOMACH SPASM: ICD-10-CM

## 2024-07-15 DIAGNOSIS — G43.809 OTHER MIGRAINE WITHOUT STATUS MIGRAINOSUS, NOT INTRACTABLE: ICD-10-CM

## 2024-07-15 DIAGNOSIS — R11.2 NAUSEA AND VOMITING, UNSPECIFIED VOMITING TYPE: ICD-10-CM

## 2024-07-15 DIAGNOSIS — J45.40 MODERATE PERSISTENT ASTHMA, UNSPECIFIED WHETHER COMPLICATED (HHS-HCC): ICD-10-CM

## 2024-07-15 DIAGNOSIS — L25.9 CONTACT DERMATITIS, UNSPECIFIED CONTACT DERMATITIS TYPE, UNSPECIFIED TRIGGER: ICD-10-CM

## 2024-07-15 DIAGNOSIS — M54.9 BACK PAIN, UNSPECIFIED BACK LOCATION, UNSPECIFIED BACK PAIN LATERALITY, UNSPECIFIED CHRONICITY: ICD-10-CM

## 2024-07-15 DIAGNOSIS — K21.9 GASTROESOPHAGEAL REFLUX DISEASE WITHOUT ESOPHAGITIS: ICD-10-CM

## 2024-07-15 RX ORDER — CYCLOBENZAPRINE HCL 10 MG
10 TABLET ORAL 2 TIMES DAILY PRN
Qty: 30 TABLET | Refills: 2 | Status: SHIPPED | OUTPATIENT
Start: 2024-07-15

## 2024-07-15 RX ORDER — ALBUTEROL SULFATE 0.83 MG/ML
SOLUTION RESPIRATORY (INHALATION)
Qty: 75 ML | Refills: 2 | Status: SHIPPED | OUTPATIENT
Start: 2024-07-15

## 2024-07-15 RX ORDER — MONTELUKAST SODIUM 10 MG/1
10 TABLET ORAL NIGHTLY
Qty: 90 TABLET | Refills: 1 | Status: SHIPPED | OUTPATIENT
Start: 2024-07-15 | End: 2025-01-11

## 2024-07-15 RX ORDER — HYDROCORTISONE 1 G/100G
1 CREAM TOPICAL 2 TIMES DAILY PRN
Qty: 57 G | Refills: 2 | Status: SHIPPED | OUTPATIENT
Start: 2024-07-15

## 2024-07-15 RX ORDER — ACETAMINOPHEN 325 MG/1
325 TABLET ORAL EVERY 6 HOURS PRN
Qty: 30 TABLET | Refills: 2 | Status: SHIPPED | OUTPATIENT
Start: 2024-07-15

## 2024-07-15 RX ORDER — ALBUTEROL SULFATE 90 UG/1
2 AEROSOL, METERED RESPIRATORY (INHALATION) EVERY 4 HOURS PRN
Qty: 6.7 G | Refills: 2 | Status: SHIPPED | OUTPATIENT
Start: 2024-07-15 | End: 2025-07-15

## 2024-07-15 RX ORDER — METOCLOPRAMIDE 10 MG/1
10 TABLET ORAL 4 TIMES DAILY
Qty: 90 TABLET | Refills: 1 | Status: SHIPPED | OUTPATIENT
Start: 2024-07-15

## 2024-07-15 RX ORDER — OMEPRAZOLE 40 MG/1
40 CAPSULE, DELAYED RELEASE ORAL DAILY
Qty: 90 CAPSULE | Refills: 1 | Status: SHIPPED | OUTPATIENT
Start: 2024-07-15

## 2024-07-15 NOTE — TELEPHONE ENCOUNTER
Many of these do not look like [facility] has filled. I did send the ones that [facility] or myself has already filled for her. Otherwise, will need to wait until follow up appointment. Thank you!

## 2024-07-15 NOTE — TELEPHONE ENCOUNTER
Med Refill   cyclobenzaprine (Flexeril) 10 mg tablet [[id number]]   EPINEPHrine 0.3 mg/0.3 mL injection syringe [[id number]]   albuterol 2.5 mg /3 mL (0.083 %) nebulizer solution [[id number]]   lancets misc [[id number]]   albuterol (Proventil HFA) 90 mcg/actuation inhaler [[id number]]   famotidine (Pepcid) 20 mg tablet [[id number]]     hydrOXYzine HCL (Atarax) 25 mg tablet [[id number]]    blood sugar diagnostic (True Metrix Glucose Test Strip) strip [[id number]]   omeprazole (PriLOSEC) 40 mg DR capsule [[id number]]   acetaminophen (Tylenol) 500 mg tablet [[id number]]   ibuprofen 600 mg tablet [[id number]]   acetaminophen (Tylenol) 325 mg tablet [[id number]]   hydrocortisone acetate 1 % cream [[id number]]   metoclopramide (Reglan) 10 mg tablet [[id number]]   montelukast (Singulair) 10 mg tablet [[id number]]     simethicone (Gas-Ex) 125 mg tablet tablet [[id number]]   busPIRone (Buspar) 10 mg tablet [[id number]]   risperiDONE (RisperDAL) 0.5 mg tablet [[id number]     [id number] - [address] [address]  Phone: [phone]  Fax: [phone]  VALDEMAR #: --     LOV: 24    NOV: 10/21/24

## 2024-09-24 ENCOUNTER — APPOINTMENT (OUTPATIENT)
Dept: OBSTETRICS AND GYNECOLOGY | Facility: CLINIC | Age: 29
End: 2024-09-24
Payer: COMMERCIAL

## 2024-10-07 ENCOUNTER — TELEPHONE (OUTPATIENT)
Dept: OBSTETRICS AND GYNECOLOGY | Facility: CLINIC | Age: 29
End: 2024-10-07
Payer: COMMERCIAL

## 2024-10-07 DIAGNOSIS — R30.0 DYSURIA: Primary | ICD-10-CM

## 2024-10-07 RX ORDER — PHENAZOPYRIDINE HYDROCHLORIDE 200 MG/1
200 TABLET, FILM COATED ORAL 3 TIMES DAILY PRN
Qty: 10 TABLET | Refills: 0 | Status: SHIPPED | OUTPATIENT
Start: 2024-10-07 | End: 2024-10-10

## 2024-10-07 NOTE — TELEPHONE ENCOUNTER
I triaged patient. Patient states that starting 2 days ago she started to experiencing burning with urination and discomfort. States she has had UTI's in the past and this is what it feels like. Patient informed an order was being sent to  outpatient lab to collect a urine clean catch. She agrees to plan, can not get to the lab / hospital until Thursday.

## 2024-10-08 ENCOUNTER — LAB (OUTPATIENT)
Dept: LAB | Facility: LAB | Age: 29
End: 2024-10-08
Payer: COMMERCIAL

## 2024-10-08 DIAGNOSIS — R30.0 DYSURIA: ICD-10-CM

## 2024-10-08 PROCEDURE — 87086 URINE CULTURE/COLONY COUNT: CPT

## 2024-10-08 RX ORDER — NITROFURANTOIN 25; 75 MG/1; MG/1
100 CAPSULE ORAL 2 TIMES DAILY
Qty: 14 CAPSULE | Refills: 0 | Status: SHIPPED | OUTPATIENT
Start: 2024-10-08 | End: 2024-10-15

## 2024-10-09 LAB — BACTERIA UR CULT: NORMAL

## 2024-10-20 ASSESSMENT — ENCOUNTER SYMPTOMS
COUGH: 1
COUGH: 1
SORE THROAT: 0
SHORTNESS OF BREATH: 0
CHILLS: 0
MYALGIAS: 0
HEADACHES: 0
HEARTBURN: 1
RHINORRHEA: 1
SHORTNESS OF BREATH: 0
FEVER: 0
HEARTBURN: 1
WHEEZING: 0
WEIGHT LOSS: 0
WHEEZING: 0
WEIGHT LOSS: 0
FEVER: 0
SORE THROAT: 0
MYALGIAS: 0
SWEATS: 0
HEADACHES: 0
CHILLS: 0
RHINORRHEA: 1
SWEATS: 0
HEMOPTYSIS: 0
HEMOPTYSIS: 0

## 2024-10-21 ENCOUNTER — APPOINTMENT (OUTPATIENT)
Dept: PRIMARY CARE | Facility: CLINIC | Age: 29
End: 2024-10-21
Payer: COMMERCIAL

## 2024-10-21 ENCOUNTER — OFFICE VISIT (OUTPATIENT)
Dept: OBSTETRICS AND GYNECOLOGY | Facility: CLINIC | Age: 29
End: 2024-10-21
Payer: COMMERCIAL

## 2024-10-21 VITALS
DIASTOLIC BLOOD PRESSURE: 77 MMHG | BODY MASS INDEX: 21.33 KG/M2 | SYSTOLIC BLOOD PRESSURE: 119 MMHG | HEIGHT: 65 IN | WEIGHT: 128 LBS | HEART RATE: 65 BPM

## 2024-10-21 VITALS — BODY MASS INDEX: 21.3 KG/M2 | WEIGHT: 128 LBS | DIASTOLIC BLOOD PRESSURE: 68 MMHG | SYSTOLIC BLOOD PRESSURE: 100 MMHG

## 2024-10-21 DIAGNOSIS — E55.9 VITAMIN D DEFICIENCY: Primary | ICD-10-CM

## 2024-10-21 DIAGNOSIS — Z11.51 SCREENING FOR HUMAN PAPILLOMAVIRUS (HPV): ICD-10-CM

## 2024-10-21 DIAGNOSIS — Z91.030 BEE ALLERGY STATUS: ICD-10-CM

## 2024-10-21 DIAGNOSIS — K31.89 STOMACH SPASM: ICD-10-CM

## 2024-10-21 DIAGNOSIS — J45.40 MODERATE PERSISTENT ASTHMA, UNSPECIFIED WHETHER COMPLICATED (HHS-HCC): ICD-10-CM

## 2024-10-21 DIAGNOSIS — Z98.890 S/P LEEP: ICD-10-CM

## 2024-10-21 DIAGNOSIS — Z01.419 WELL WOMAN EXAM WITH ROUTINE GYNECOLOGICAL EXAM: ICD-10-CM

## 2024-10-21 DIAGNOSIS — R73.01 ELEVATED FASTING BLOOD SUGAR: ICD-10-CM

## 2024-10-21 DIAGNOSIS — G43.809 OTHER MIGRAINE WITHOUT STATUS MIGRAINOSUS, NOT INTRACTABLE: ICD-10-CM

## 2024-10-21 DIAGNOSIS — Z76.89 ENCOUNTER TO ESTABLISH CARE: ICD-10-CM

## 2024-10-21 DIAGNOSIS — Z12.72 SCREENING FOR VAGINAL CANCER: ICD-10-CM

## 2024-10-21 DIAGNOSIS — N89.8 VAGINAL DISCHARGE: Primary | ICD-10-CM

## 2024-10-21 DIAGNOSIS — K21.9 GASTROESOPHAGEAL REFLUX DISEASE WITHOUT ESOPHAGITIS: ICD-10-CM

## 2024-10-21 DIAGNOSIS — L25.9 CONTACT DERMATITIS, UNSPECIFIED CONTACT DERMATITIS TYPE, UNSPECIFIED TRIGGER: ICD-10-CM

## 2024-10-21 DIAGNOSIS — M54.9 BACK PAIN, UNSPECIFIED BACK LOCATION, UNSPECIFIED BACK PAIN LATERALITY, UNSPECIFIED CHRONICITY: ICD-10-CM

## 2024-10-21 DIAGNOSIS — E53.8 VITAMIN B 12 DEFICIENCY: ICD-10-CM

## 2024-10-21 DIAGNOSIS — R11.2 NAUSEA AND VOMITING, UNSPECIFIED VOMITING TYPE: ICD-10-CM

## 2024-10-21 DIAGNOSIS — D06.9 CIN III (CERVICAL INTRAEPITHELIAL NEOPLASIA III): ICD-10-CM

## 2024-10-21 PROCEDURE — 99213 OFFICE O/P EST LOW 20 MIN: CPT | Performed by: NURSE PRACTITIONER

## 2024-10-21 PROCEDURE — 87205 SMEAR GRAM STAIN: CPT

## 2024-10-21 PROCEDURE — 99214 OFFICE O/P EST MOD 30 MIN: CPT | Performed by: CLINICAL NURSE SPECIALIST

## 2024-10-21 PROCEDURE — 87624 HPV HI-RISK TYP POOLED RSLT: CPT

## 2024-10-21 PROCEDURE — 87591 N.GONORRHOEAE DNA AMP PROB: CPT

## 2024-10-21 PROCEDURE — 4004F PT TOBACCO SCREEN RCVD TLK: CPT | Performed by: NURSE PRACTITIONER

## 2024-10-21 PROCEDURE — 3008F BODY MASS INDEX DOCD: CPT | Performed by: CLINICAL NURSE SPECIALIST

## 2024-10-21 PROCEDURE — 99395 PREV VISIT EST AGE 18-39: CPT | Performed by: NURSE PRACTITIONER

## 2024-10-21 PROCEDURE — 99406 BEHAV CHNG SMOKING 3-10 MIN: CPT | Performed by: CLINICAL NURSE SPECIALIST

## 2024-10-21 PROCEDURE — 4004F PT TOBACCO SCREEN RCVD TLK: CPT | Performed by: CLINICAL NURSE SPECIALIST

## 2024-10-21 PROCEDURE — 87491 CHLMYD TRACH DNA AMP PROBE: CPT

## 2024-10-21 PROCEDURE — 87661 TRICHOMONAS VAGINALIS AMPLIF: CPT

## 2024-10-21 RX ORDER — METRONIDAZOLE 7.5 MG/G
GEL VAGINAL DAILY
Qty: 70 G | Refills: 0 | Status: SHIPPED | OUTPATIENT
Start: 2024-10-21 | End: 2024-10-26

## 2024-10-21 RX ORDER — BROMPHENIRAMINE MALEATE, PSEUDOEPHEDRINE HYDROCHLORIDE, AND DEXTROMETHORPHAN HYDROBROMIDE 2; 30; 10 MG/5ML; MG/5ML; MG/5ML
5 SYRUP ORAL 4 TIMES DAILY PRN
Qty: 120 ML | Refills: 0 | Status: SHIPPED | OUTPATIENT
Start: 2024-10-21 | End: 2024-10-31

## 2024-10-21 ASSESSMENT — ENCOUNTER SYMPTOMS
COUGH: 1
WEIGHT LOSS: 0
WOUND: 0
TROUBLE SWALLOWING: 0
SHORTNESS OF BREATH: 0
CONSTIPATION: 0
BACK PAIN: 0
ARTHRALGIAS: 0
FATIGUE: 0
NAUSEA: 0
HEMOPTYSIS: 0
UNEXPECTED WEIGHT CHANGE: 0
BRUISES/BLEEDS EASILY: 0
DIARRHEA: 0
HEARTBURN: 1
CHEST TIGHTNESS: 0
WHEEZING: 0
CONFUSION: 0
OCCASIONAL FEELINGS OF UNSTEADINESS: 0
EYE PAIN: 0
CHILLS: 0
ABDOMINAL PAIN: 0
DYSURIA: 0
JOINT SWELLING: 0
ACTIVITY CHANGE: 0
RHINORRHEA: 1
SWEATS: 0
VOMITING: 0
FEVER: 0
BLOOD IN STOOL: 0
DIZZINESS: 0
SEIZURES: 0
APPETITE CHANGE: 0
MYALGIAS: 0
DEPRESSION: 0
LOSS OF SENSATION IN FEET: 0
SLEEP DISTURBANCE: 0
HEMATURIA: 0
HEADACHES: 0
PALPITATIONS: 0
NECK PAIN: 0
FLANK PAIN: 0
SORE THROAT: 0
PHOTOPHOBIA: 0
POLYDIPSIA: 0

## 2024-10-21 ASSESSMENT — COLUMBIA-SUICIDE SEVERITY RATING SCALE - C-SSRS
1. IN THE PAST MONTH, HAVE YOU WISHED YOU WERE DEAD OR WISHED YOU COULD GO TO SLEEP AND NOT WAKE UP?: NO
6. HAVE YOU EVER DONE ANYTHING, STARTED TO DO ANYTHING, OR PREPARED TO DO ANYTHING TO END YOUR LIFE?: NO
2. HAVE YOU ACTUALLY HAD ANY THOUGHTS OF KILLING YOURSELF?: NO

## 2024-10-21 ASSESSMENT — ANXIETY QUESTIONNAIRES
IF YOU CHECKED OFF ANY PROBLEMS ON THIS QUESTIONNAIRE, HOW DIFFICULT HAVE THESE PROBLEMS MADE IT FOR YOU TO DO YOUR WORK, TAKE CARE OF THINGS AT HOME, OR GET ALONG WITH OTHER PEOPLE: NOT DIFFICULT AT ALL
1. FEELING NERVOUS, ANXIOUS, OR ON EDGE: MORE THAN HALF THE DAYS
7. FEELING AFRAID AS IF SOMETHING AWFUL MIGHT HAPPEN: NOT AT ALL
5. BEING SO RESTLESS THAT IT IS HARD TO SIT STILL: NOT AT ALL
GAD7 TOTAL SCORE: 4
6. BECOMING EASILY ANNOYED OR IRRITABLE: NOT AT ALL
2. NOT BEING ABLE TO STOP OR CONTROL WORRYING: SEVERAL DAYS
4. TROUBLE RELAXING: NOT AT ALL
3. WORRYING TOO MUCH ABOUT DIFFERENT THINGS: SEVERAL DAYS

## 2024-10-21 ASSESSMENT — PATIENT HEALTH QUESTIONNAIRE - PHQ9
10. IF YOU CHECKED OFF ANY PROBLEMS, HOW DIFFICULT HAVE THESE PROBLEMS MADE IT FOR YOU TO DO YOUR WORK, TAKE CARE OF THINGS AT HOME, OR GET ALONG WITH OTHER PEOPLE: SOMEWHAT DIFFICULT
SUM OF ALL RESPONSES TO PHQ9 QUESTIONS 1 AND 2: 2
2. FEELING DOWN, DEPRESSED OR HOPELESS: MORE THAN HALF THE DAYS
1. LITTLE INTEREST OR PLEASURE IN DOING THINGS: NOT AT ALL

## 2024-10-21 NOTE — PROGRESS NOTES
Subjective   Patient ID: Cari Reina is a 29 y.o. female who presents for transfer from   and Headache.  Cough  This is a new problem. The current episode started yesterday. The problem has been unchanged. The problem occurs hourly. The cough is Non-productive and productive of sputum. Associated symptoms include ear congestion, ear pain, heartburn, nasal congestion and rhinorrhea. Pertinent negatives include no chest pain, chills, fever, headaches, hemoptysis, myalgias, postnasal drip, rash, sore throat, shortness of breath, sweats, weight loss or wheezing. The symptoms are aggravated by dust and pollens.       New patient here today to establish care.  Transfer care from . Last visit July 2023.     Patient has been taking Prilosec, Pepcid, and Reglan PRN. Symptoms controlled a this time.     Has been having issues with Headaches. Unsure if related to increased stress. Recently increased Mental Health medications to see if helps. Follows with [facility]. Recently increased medication due to her father passing.     Has not had treatment for Migraines in the past other then Tylenol OTC. Thinks she may have been on a medication daily in the past, unsure what the name of the medication was but unable to tolerate.     Asthma, using Albuterol and Nebulizer PRN. Cough, nonproductive. Intermittent uses of Mucus. Requesting a new machine and cough syrup.     Review of Systems   Constitutional:  Negative for activity change, appetite change, chills, fatigue, fever, unexpected weight change and weight loss.   HENT:  Positive for ear pain and rhinorrhea. Negative for hearing loss, nosebleeds, postnasal drip, sore throat, tinnitus and trouble swallowing.    Eyes:  Negative for photophobia, pain and visual disturbance.   Respiratory:  Positive for cough. Negative for hemoptysis, chest tightness, shortness of breath and wheezing.    Cardiovascular:  Negative for chest pain, palpitations and leg swelling.    Gastrointestinal:  Positive for heartburn. Negative for abdominal pain, blood in stool, constipation, diarrhea, nausea and vomiting.   Endocrine: Negative for cold intolerance, heat intolerance, polydipsia and polyuria.   Genitourinary:  Negative for dysuria, flank pain and hematuria.   Musculoskeletal:  Negative for arthralgias, back pain, joint swelling, myalgias and neck pain.   Skin:  Negative for pallor, rash and wound.   Allergic/Immunologic: Negative for immunocompromised state.   Neurological:  Negative for dizziness, seizures and headaches.   Hematological:  Does not bruise/bleed easily.   Psychiatric/Behavioral:  Negative for confusion and sleep disturbance.        Objective   Physical Exam  Vitals and nursing note reviewed.   Constitutional:       General: She is not in acute distress.     Appearance: Normal appearance.   HENT:      Head: Normocephalic.      Nose: Nose normal.   Eyes:      Conjunctiva/sclera: Conjunctivae normal.   Neck:      Vascular: No carotid bruit.   Cardiovascular:      Rate and Rhythm: Normal rate and regular rhythm.      Pulses: Normal pulses.      Heart sounds: Normal heart sounds.   Pulmonary:      Effort: Pulmonary effort is normal.      Breath sounds: Normal breath sounds.   Abdominal:      General: Bowel sounds are normal.      Palpations: Abdomen is soft.   Musculoskeletal:         General: Normal range of motion.      Cervical back: Normal range of motion.   Skin:     General: Skin is warm and dry.   Neurological:      Mental Status: She is alert and oriented to person, place, and time. Mental status is at baseline.   Psychiatric:         Mood and Affect: Mood normal.         Behavior: Behavior normal.         Assessment/Plan       Due for updated lab work, new order provided at [facility] today.      Vitamin D Deficiency: Vitamin D. Reevaluate with next lab work.   Prediabetes: A1C 5.9%. Lifestyle changes recommended: Diet consisting of low fat foods, lean meats, high fiber,  fresh fruits and vegetables. 150 min/ weekly aerobic exercise.   Nicotine Dependence: 3 minutes were spent counseling the patient on tobacco cessation.  Benefits of cessation were discussed as well as techniques to help quit.    Vitamin B12 Deficiency: Vitamin B12. Reevaluate with next lab work.   Migraine: Will monitor if symptoms improve after changing her mental health medications.   Asthma: Nebulizer and Albuterol PRN. Cough syrup as prescribed.   GERD: Medications as prescribed. Follow up with any worsening in symptoms. Continue to monitor.    Unable to update immunizations due to Insurance.   Following with Midwife for GYN.     Michelle Betancourt, YOLANDA-CNS 10/21/24 1:15 PM

## 2024-10-21 NOTE — PROGRESS NOTES
Annual and problem visit.     Subjective   Cari Reina is a 29 y.o. female who is here for a routine exam. Periods are absent and hyst.     Pt called in a few weeks ago with concerns for PID and a UTI. Urine culture negative, and she was treated with macrobid. Pt states she didn't take the macrobid and is noticing a vaginal discharge with odor.     Current contraception:  hysterectomy     History of abnormal Pap smear: yes - LEEP LORENA II-LORENA III  Family history of uterine or ovarian cancer: no  Regular self breast exam: no    History of abnormal mammogram: no  Family history of breast cancer: yes - great aunt with breast cancer    Last pap: WNL  Hx of LEEP 2019 LORENA 2-3    Review of Systems:    Constitutional: Negative.    HENT: Negative.     Eyes: Negative.    Respiratory: Negative.     Cardiovascular: Negative.    Gastrointestinal: Negative.    Endocrine: Negative.    Genitourinary: Negative.    Musculoskeletal: Negative.    Skin: Negative.    Allergic/Immunologic: Negative.    Neurological: Negative.    Hematological: Negative.    Psychiatric/Behavioral: Negative.       Past Medical History:   Diagnosis Date    36 weeks gestation of pregnancy ([facility]) 07/07/2022    36 weeks gestation of pregnancy    ADHD     Allergic rhinitis 07/11/2023    Bipolar 1 disorder (Multi)     Cigarette nicotine dependence without complication 07/11/2023    Encounter for supervision of normal pregnancy, unspecified, first trimester 01/05/2022    Encounter for pregnancy related examination, first trimester    Encounter for supervision of other normal pregnancy, second trimester 05/05/2022    Multigravida in second trimester    Encounter for supervision of other normal pregnancy, third trimester 07/21/2022    Multigravida in third trimester    Encounter for surveillance of injectable contraceptive 10/20/2022    Encounter for surveillance of injectable contraceptive    FAS (fetal alcohol syndrome) ([facility])     Hemorrhage in early  pregnancy, unspecified ([facility]) 01/20/2022    Bleeding in early pregnancy    History of prediabetes     Other specified pregnancy related conditions, unspecified trimester ([facility]) 03/21/2022    Pelvic cramping in antepartum period    Other specified symptoms and signs involving the circulatory and respiratory systems 08/15/2022    Chest congestion    Personal history of other diseases of the female genital tract 07/26/2021    History of amenorrhea    Personal history of other diseases of the female genital tract 08/30/2021    History of abnormal uterine bleeding    Personal history of other mental and behavioral disorders     History of anxiety      Past Surgical History:   Procedure Laterality Date    CERVICAL BIOPSY  W/ LOOP ELECTRODE EXCISION      HYSTERECTOMY      OTHER SURGICAL HISTORY  09/28/2020    Loop electrosurgical excision procedure      Menstrual History:  OB History    No obstetric history on file.        Means that we do medical staff meeting definitely we can find this  Patient's last menstrual period was 10/27/2021.         Review of Systems    Objective   /68   Wt 58.1 kg (128 lb)   LMP 10/27/2021   BMI 21.30 kg/m²     Exam:   Constitutional; alert with no acute distress.  Well-nourished.      Neck: No asymmetry noted.  Thyroid without enlargement.  No palpable nodules or masses of concern.    Cardiovascular: Heart regular rate and rhythm, normal S1 and S2    Pulmonary: No respiratory distress, lungs clear to auscultate bilaterally    Chest/breast exam: Appearance bilaterally normal, without asymmetry of concern, without skin lesions or nipple discharge.  Palpation of the breast-no palpable masses no lymphadenopathy of the axilla.    Abdomen: Soft, nontender, no abdominal masses palpated    Genitourinary:  Palpation of the lymph nodes of the groin-no inguinal lymphadenopathy  External genitalia/perianal: Without lesions normal in appearance   Urethra: In appearance without lesions  Bladder: non-tender to palpate  Vagina: Without lesions including Bartholin, urethra, [place]'s glands within normal limits all WNL   +copious milky discharge noted on exam  Cervix & Uterus : surgically absent   Bilateral adnexa:  without masses, nontender to palpate    Skin: Normal skin color and pigmentation    Psychiatric: Alert and oriented x3. Affect normal to patient baseline.  Mood: appropriate       Assessment/Plan   Diagnoses and all orders for this visit:  Vaginal discharge  -     metroNIDAZOLE (Metrogel) 0.75 % (37.5mg/5 gram) vaginal gel; Insert into the vagina once daily for 5 days.  -     Vaginitis Gram Stain For Bacterial Vaginosis + Yeast  Screening for vaginal cancer  -     THINPREP PAP  Screening for human papillomavirus (HPV)  -     THINPREP PAP  Well woman exam with routine gynecological exam  LORENA III (cervical intraepithelial neoplasia III)  Comments:  2019 LEEP  Orders:  -     THINPREP PAP  S/P LEEP  -     THINPREP PAP      No follow-ups on file.     Pap plan: pending   STI screening annually and prn if needed  Contraception discussed  Safe sex discussed   RTO 1 year annual exam, prn   Mammogram screening evaluation     Treat with MetroGel and send cultures and STD panel.  Patient is agreeable to start MetroGel as she is very symptomatic of bacterial vaginosis.  Return to office in 1 month if symptoms persist after MetroGel treatment.    Chantal Rosa, YOLANDA-MARCIE, APRN-CNP

## 2024-10-22 ENCOUNTER — TELEPHONE (OUTPATIENT)
Dept: OBSTETRICS AND GYNECOLOGY | Facility: CLINIC | Age: 29
End: 2024-10-22
Payer: COMMERCIAL

## 2024-10-22 LAB
C TRACH RRNA SPEC QL NAA+PROBE: NEGATIVE
CLUE CELLS VAG LPF-#/AREA: ABNORMAL /[LPF]
N GONORRHOEA DNA SPEC QL PROBE+SIG AMP: NEGATIVE
NUGENT SCORE: 7
YEAST VAG WET PREP-#/AREA: ABNORMAL

## 2024-10-22 NOTE — RESULT ENCOUNTER NOTE
I would like to treat her with flagyl, or clindamycin vaginal since she has had metrogel in the past. Mirian you see if she is willing to take flagyl? And pend if able, I will send it tonight and  it can be at the pharmacy tomorrow. She has a few other pending cultures, just so she knows.

## 2024-10-23 DIAGNOSIS — A59.01 TRICHOMONAL VAGINITIS: Primary | ICD-10-CM

## 2024-10-23 LAB — T VAGINALIS RRNA SPEC QL NAA+PROBE: POSITIVE

## 2024-10-23 RX ORDER — METRONIDAZOLE 500 MG/1
2000 TABLET ORAL ONCE
Qty: 4 TABLET | Refills: 0 | Status: SHIPPED | OUTPATIENT
Start: 2024-10-23 | End: 2024-10-23

## 2024-10-23 NOTE — TELEPHONE ENCOUNTER
Pt notified, agreeable with plan. She has already disclosed positive result to partner and he is agreeable to treatment as well. Sent his info to [facility] to send script. Advised to abstain from intercourse 1 week after treatment and to follow up in 3 months for retesting. Pt agreeable

## 2024-10-23 NOTE — PROGRESS NOTES
Trich    Diagnoses and all orders for this visit:  Trichomonal vaginitis (Primary)  -     metroNIDAZOLE (Flagyl) 500 mg tablet; Take 4 tablets (2,000 mg) by mouth 1 time for 1 dose.       Chantal Rosa, YOLANDA-LAURIEM, APRN-CNP

## 2024-10-23 NOTE — RESULT ENCOUNTER NOTE
She needs to take 2 grams of flagyl due to the dx of trich. Her partner needs treated as well. I am glad to call this in. I am not sure how long this has been present, and I am not sure if she was previously tested recently. I would like to retest her in 3 months to make sure this is gone though.     I will send the meds for her to the pharmacy, she can continue the metrogel as well.     Thanks,   [facility]

## 2024-10-23 NOTE — TELEPHONE ENCOUNTER
----- Message from Chantal Rosa sent at 10/23/2024  8:15 AM EDT -----  She needs to take 2 grams of flagyl due to the dx of trich. Her partner needs treated as well. I am glad to call this in. I am not sure how long this has been present, and I am not sure if she was previously tested recently. I would like to retest he  r in 3 months to make sure this is gone though.     I will send the meds for her to the pharmacy, she can continue the metrogel as well.     Thanks,   [facility]

## 2024-10-28 ENCOUNTER — TELEPHONE (OUTPATIENT)
Dept: OBSTETRICS AND GYNECOLOGY | Facility: CLINIC | Age: 29
End: 2024-10-28
Payer: COMMERCIAL

## 2024-10-31 ENCOUNTER — TELEPHONE (OUTPATIENT)
Dept: OBSTETRICS AND GYNECOLOGY | Facility: CLINIC | Age: 29
End: 2024-10-31
Payer: COMMERCIAL

## 2024-10-31 LAB
CYTOLOGY CMNT CVX/VAG CYTO-IMP: NORMAL
HPV HR 12 DNA GENITAL QL NAA+PROBE: NEGATIVE
HPV HR GENOTYPES PNL CVX NAA+PROBE: NEGATIVE
HPV16 DNA SPEC QL NAA+PROBE: NEGATIVE
HPV18 DNA SPEC QL NAA+PROBE: NEGATIVE
LAB AP HPV GENOTYPE QUESTION: YES
LAB AP HPV HR: NORMAL
LAB AP PAP ADDITIONAL TESTS: NORMAL
LAB AP PREVIOUS ABNORMAL HISTORY: NORMAL
LAB AP TREATMENT HISTORY: NORMAL
LABORATORY COMMENT REPORT: NORMAL
MENSTRUAL HX REPORTED: NORMAL
PATH REPORT.TOTAL CANCER: NORMAL

## 2024-11-08 ENCOUNTER — TELEPHONE (OUTPATIENT)
Dept: OBSTETRICS AND GYNECOLOGY | Facility: CLINIC | Age: 29
End: 2024-11-08
Payer: COMMERCIAL

## 2024-11-08 NOTE — TELEPHONE ENCOUNTER
----- Message from Chantal Rosa sent at 11/8/2024  6:56 AM EST -----  ASCUS pap with negative HPV, we will repap next year. I think this is more related to the inflammation from the trich being positive.    3/6/2023       RE: Anayeli Gagnon  [address] [facility] [address]     Dear Colleague,    Thank you for referring your patient, Anayeli Gagnon, to the [facility] FOR COMPREHENSIVE PAIN MANAGEMENT [place] at [facility]. Please see a copy of my visit note below.      Pain clinic follow up note      HPI:   Anayeli Gagnon is a 71 year old year old female  who presents to the pain clinic via video visit for follow up and ITP adjustment.    Patient Supplied Answers To the [facility] Pain Questionnaire  [facility] Pain -  Patient Entered Questionnaire/Answers 3/5/2023   What number best describes your pain right now:  0 = No pain  to  10 = Worst pain imaginable 8   How would you describe the pain? burning, sharp, numbness, dull, aching, throbbing, pressure   Which of the following worsen your pain? sitting, coughing / sneezing   Which of the following improve or reduce your pain? nothing relieves the pain   What number best describes your average pain for the past week:  0 = No pain  to  10 = Worst pain imaginable 7   What number best describes your LOWEST pain in past 24 hours:  0 = No pain  to  10 = Worst pain imaginable 2   What number best describes your WORST pain in past 24 hours:  0 = No pain  to  10 = Worst pain imaginable 8   When is your pain worst? Night, Constant   What non-medicine treatments have you already had for your pain? spinal cord stimulator   Have you tried treating your pain with medication?  -   Are you currently taking medications for your pain? -       Since her last visit with Dr. Espinoza she has continued fatigue and pain following exertion/activity. Ocassionaly she wakes up during the night and uses a bolus. She uses her boluses throughout the day and then saves 1-2 for the evening when it worsens. She wakes up feeling improved. She spends her time sewing/cleaning/housework. Bending over in the chair causes pain.    Her pain  continues to hover at 7/10 during the day and the best it gets is a 3/10 after a bolus. The bolus is only providing around 1-2 hours. The pain continues to localize to her groin and feet/ankles. She is scheduled for a refill on 3/8.    She denies any acute changes in her B/B, strength.    No new imaging reviewed with the patient:    ROS:  Review of Systems   Constitutional: Negative for chills, fever, malaise/fatigue and weight loss.   Eyes: Negative for blurred vision.   Cardiovascular: Negative for chest pain and palpitations.   Skin: Negative for rash.         Significant Medical History:   Past Medical History:   Diagnosis Date     CARDIAC DYSRHYTHMIAS NEC 10/3/2007    Taking atenelol for years for this     History of skin cancer      Mitral valve prolapse      Neurogenic bladder      Sacral decubitus ulcer, stage IV (H)      Thoracic spinal cord injury (H)           Past Surgical History:  Past Surgical History:   Procedure Laterality Date     DECOMPRESSION LUMBAR ONE LEVEL N/A 12/27/2019    Procedure: Posterior spinal decompression;  Surgeon: Alf Ritchie MD;  Location: [facility] OR     INSERT INTRATHECAL PAIN PUMP N/A 1/19/2022    Procedure: INSERTION, INTRATHECAL ANALGESIC PUMP, externalized trial;  Surgeon: Luis Orourke MD;  Location: [facility] OR     INSERT INTRATHECAL PAIN PUMP Right 1/21/2022    Procedure: INSERTION, INTRATHECAL ANALGESIC PUMP;  Surgeon: Luis Orourke MD;  Location: [facility] OR     INSERT STIMULATOR AND LEADS INTERNAL DORSAL COLUMN N/A 4/7/2021    Procedure: Posterior thoracic 12 to Lumbar 1 level for placement of spinal cord stimulator paddle and placement of implantable pulse generator/battery over right buttock;  Surgeon: Luis Orourke MD;  Location: [facility] OR     IR SPINAL ANGIOGRAM  10/16/2019     IR SPINAL ANGIOGRAM  12/20/2019     LAPAROSCOPIC TUBAL LIGATION       REPAIR SPINAL ARERIOVENOUS MALFORMATION N/A 12/27/2019    Procedure: with surgical disconnection arterial venous fistula  Thoracic 5;  Surgeon: Alf Ritchie MD;  [facility]          Family History:  Family History   Problem Relation Age of Onset     C.A.D. Father          41     Deep Vein Thrombosis (DVT) No family hx of      Anesthesia Reaction No family hx of           Social History:  Social History     Socioeconomic History     Marital status:      Spouse name: Not on file     Number of children: Not on file     Years of education: Not on file     Highest education level: Not on file   Occupational History     Occupation: NA     Employer: [employer]   Tobacco Use     Smoking status: Never     Smokeless tobacco: Never   Substance and Sexual Activity     Alcohol use: No     Drug use: No     Sexual activity: Yes     Partners: Male   Other Topics Concern     Parent/sibling w/ CABG, MI or angioplasty before 65F 55M? Not Asked   Social History Narrative    Lives with spouse - works in [employer].     Social Determinants of Health     Financial Resource Strain: Not on file   Food Insecurity: Not on file   Transportation Needs: Not on file   Physical Activity: Not on file   Stress: Not on file   Social Connections: Not on file   Intimate Partner Violence: Not on file   Housing Stability: Not on file     Social History     Social History Narrative    Lives with spouse - works in [employer].          Allergies:  Allergies   Allergen Reactions     Contrast Dye Rash     Painful rash after x-ray contrast       Current Medications:   Current Outpatient Medications   Medication Sig Dispense Refill     acetaminophen (TYLENOL) 500 MG tablet Take 500-1,000 mg by mouth every 6 hours as needed for mild pain       baclofen (LIORESAL) 10 MG tablet Take 10 mg by mouth 3 times daily as needed for muscle spasms       COMPOUND CONTAINING CONTROLLED SUBSTANCE (CMPD RX) - PHARMACY TO MIX COMPOUNDED MEDICATION Concentrations: Dilaudid    14 mg/ml                                                         Bupivacaine 28 mg/ml  Ziconitide 14 mcg/ml   Total Volume in Refill: 20 mL    Basal:  Dilaudid    7 mg/day                                                      Bupivacaine 14 mg/day Ziconitide 7 mcg/day   PTM 0.2 hydromorphone, 2 hour lockout, maximum 6 in a day.  Maximum 24 hour dose Hydromorphone 8.16 mg/day Bupivacaine 16.3 mg/day Ziconitide 8.16 mcg/day  Pentec to increase ose on 12/8/2022 20 mL 0     gabapentin (NEURONTIN) 600 MG tablet Take 1,200 mg by mouth 3 times daily  1 tablet 0     lidocaine (LIDODERM) 5 % patch Apply 1 patch topically daily as needed        LORazepam (ATIVAN) 0.5 MG tablet Take 1 tablet by mouth 2 times daily as needed       melatonin 3 MG tablet Take 3 mg by mouth       melatonin 3 MG tablet Take 3 mg by mouth nightly as needed        metoprolol succinate ER (TOPROL-XL) 25 MG 24 hr tablet Take 12.5 mg by mouth every evening        Multiple Vitamins-Minerals (WOMENS MULTI PO) Take 1 tablet by mouth daily       ondansetron (ZOFRAN) 4 MG tablet Take 1 tablet (4 mg) by mouth every 6 hours as needed for nausea 20 tablet 1     medical cannabis (Patient's own supply) See Admin Instructions (The purpose of this order is to document that the patient reports taking medical cannabis.  This is not a prescription, and is not used to certify that the patient has a qualifying medical condition.)     Liquid oral formulation. (Patient not taking: Reported on 7/20/2022)       naloxone (NARCAN) 4 MG/0.1ML nasal spray Spray 1 spray (4 mg) into one nostril alternating nostrils as needed for opioid reversal every 2-3 minutes until assistance arrives 0.2 mL 0     oxyCODONE (ROXICODONE) 5 MG tablet Take 1 tablet (5 mg) by mouth every 4 hours as needed for moderate to severe pain 20 tablet 0     polyethylene glycol (MIRALAX) 17 GM/Dose powder Take 17 g by mouth daily as needed for constipation                Current Pain Medications:  ITP  Tylenol  Baclofen  Gabapentin      Physical Exam:     There were no vitals taken for this  visit.  Examination limited due to virtual nature  General Appearance: No distress, seated comfortably  Mood: Euthymic  HE ENT: EOMI  Respiratory: Non labored breathing    ASSESSMENT AND PLAN:     Encounter Diagnosis:  Thoracic spinal cord injury  Incomplete paraplegia  Neuropathic pain  OA  Hx of deep tissue injuries       Anayeli Gagnon is a 71 year old year old female  who presents to the pain clinic via video visit. Her pain is manageable at the current levels and is worse with activity and in the evening. PTM provides relief for 1-2 hours. She has had muscle jerking in the legs.     RECOMMENDATIONS:     1. Medications: Start Baclofen 10 mg po TID, no change to IDDS dosing    2. Procedure: No procedures at this time    3. Education: Patient understands and agrees with plan.    Concentrations:  Dilaudid    14 mg/ml                                                          Bupivacaine 28 mg/ml  Ziconitide 14 mcg/ml     Total Volume in Refill: 20 mL      Basal:   Dilaudid    7 mg/day                                                       Bupivacaine 14 mg/day  Ziconitide 7 mcg/day     PTM 0.2 hydromorphone, 2 hour lockout, maximum 6 in a day.    Maximum 24 hour dose  Hydromorphone 8.16 mg/day  Bupivacaine 16.3 mg/day  Ziconitide 8.16 mcg/day    Follow up: in 4 weeks over video    The patient's assessment and plan was discussed with my attending physician Dr. Espinoza.    Mickey Shipman, DO  Pain medicine fellow      Answers for HPI/ROS submitted by the patient on 3/5/2023  General Symptoms: No  Skin Symptoms: No  HENT Symptoms: No  EYE SYMPTOMS: No  HEART SYMPTOMS: No  LUNG SYMPTOMS: No  INTESTINAL SYMPTOMS: No  URINARY SYMPTOMS: No  GYNECOLOGIC SYMPTOMS: No  BREAST SYMPTOMS: No  SKELETAL SYMPTOMS: No  BLOOD SYMPTOMS: No  NERVOUS SYSTEM SYMPTOMS: No  MENTAL HEALTH SYMPTOMS: No        ATTENDING ATTESTATION  I saw the patient along with the pain medicine fellow Dr. Mickey Shipman. Please see his note above for  full details. I was involved in gathering history, physical examination and development of the plan of care. Briefly, Annamarie is stable with the IDDS medications. In Nov 2022 I made a slight increase in the basal infusion. The patient is reporting that her pain is worse with activity and towards the end of the day. Pain makes her tired. She clarifies that this is not new. She feels the best in the monring. She reports jerking of the legs below the waist especially in the evenings when her pain is worse. She reports that the PTM helps for 1 hour. She does not experience increased tiredness with the PTM. She is able to complete her work after the PTM.     ASSESSMENT AND PLAN:     Encounter Diagnosis:  Laminectomies from T3-T12  Spinal subdural hematoma  Paraplegia  Neuropathic pain   Paddle SCS insitu  Opioid dependence  IDDS insitu     Anayeli Gagnon is a 72 year old y.o. old female who presents to the pain clinic with neuropathic pain after spinal cord injury. The patient pain is manageable after IDDS.     I discussed 3 options with the patient. 1. Leave treatment as is. 2. Provide her with a 7th PTM dose for the end of the day. 3. Take baclofen RTC in addition to her current IDDS.     The patient agreed to take baclofen RTC>     RECOMMENDATIONS:     1. Medications: We are prescribing the patient baclofen 10 mg po tid. Dosing, side effects, risks/benefits/alternatives were discussed with the patient in detail.    Continue IDDS as is:   Concentrations:  Dilaudid    14 mg/ml                                                          Bupivacaine 28 mg/ml  Ziconitide 14 mcg/ml     Total Volume in Refill: 20 mL      Basal:   Dilaudid    7 mg/day                                                       Bupivacaine 14 mg/day  Ziconitide 7 mcg/day     PTM 0.2 hydromorphone, 2 hour lockout, maximum 6 in a day.     Maximum 24 hour dose  Hydromorphone 8.16 mg/day  Bupivacaine 16.3 mg/day  Ziconitide 8.16 mcg/day        Follow  up: 4 weeks. video        Answers for HPI/ROS submitted by the patient on 3/5/2023  General Symptoms: No  Skin Symptoms: No  HENT Symptoms: No  EYE SYMPTOMS: No  HEART SYMPTOMS: No  LUNG SYMPTOMS: No  INTESTINAL SYMPTOMS: No  URINARY SYMPTOMS: No  GYNECOLOGIC SYMPTOMS: No  BREAST SYMPTOMS: No  SKELETAL SYMPTOMS: No  BLOOD SYMPTOMS: No  NERVOUS SYSTEM SYMPTOMS: No  MENTAL HEALTH SYMPTOMS: No        Attestation signed by Alia Reyes RN at 3/6/2023 12:00 PM:  RN confirmed with provider that no adjustments have been made. Prescription was sent to [facility] last week, no need to send a new prescription due to no changes.     Alia Reyes RNCC        Sincerely,    Jasmine Espinoza MD

## 2024-12-07 ENCOUNTER — HOSPITAL ENCOUNTER (EMERGENCY)
Facility: HOSPITAL | Age: 29
Discharge: HOME | End: 2024-12-08
Payer: COMMERCIAL

## 2024-12-07 ENCOUNTER — APPOINTMENT (OUTPATIENT)
Dept: RADIOLOGY | Facility: HOSPITAL | Age: 29
End: 2024-12-07
Payer: COMMERCIAL

## 2024-12-07 VITALS
TEMPERATURE: 98.1 F | SYSTOLIC BLOOD PRESSURE: 114 MMHG | DIASTOLIC BLOOD PRESSURE: 85 MMHG | HEART RATE: 84 BPM | WEIGHT: 128 LBS | RESPIRATION RATE: 16 BRPM | BODY MASS INDEX: 21.33 KG/M2 | OXYGEN SATURATION: 98 % | HEIGHT: 65 IN

## 2024-12-07 DIAGNOSIS — S93.402A SPRAIN OF LEFT ANKLE, INITIAL ENCOUNTER: Primary | ICD-10-CM

## 2024-12-07 PROCEDURE — 73610 X-RAY EXAM OF ANKLE: CPT | Mod: LEFT SIDE | Performed by: RADIOLOGY

## 2024-12-07 PROCEDURE — 73630 X-RAY EXAM OF FOOT: CPT | Mod: LT

## 2024-12-07 PROCEDURE — 73630 X-RAY EXAM OF FOOT: CPT | Mod: LEFT SIDE | Performed by: RADIOLOGY

## 2024-12-07 PROCEDURE — 73610 X-RAY EXAM OF ANKLE: CPT | Mod: LT

## 2024-12-07 PROCEDURE — 2500000001 HC RX 250 WO HCPCS SELF ADMINISTERED DRUGS (ALT 637 FOR MEDICARE OP): Performed by: NURSE PRACTITIONER

## 2024-12-07 PROCEDURE — 99284 EMERGENCY DEPT VISIT MOD MDM: CPT

## 2024-12-07 RX ORDER — ACETAMINOPHEN 325 MG/1
975 TABLET ORAL ONCE
Status: COMPLETED | OUTPATIENT
Start: 2024-12-07 | End: 2024-12-07

## 2024-12-07 RX ADMIN — ACETAMINOPHEN 975 MG: 325 TABLET ORAL at 23:00

## 2024-12-07 ASSESSMENT — PAIN SCALES - GENERAL
PAINLEVEL_OUTOF10: 8
PAINLEVEL_OUTOF10: 8

## 2024-12-07 ASSESSMENT — PAIN DESCRIPTION - ORIENTATION: ORIENTATION: LEFT

## 2024-12-07 ASSESSMENT — LIFESTYLE VARIABLES
EVER FELT BAD OR GUILTY ABOUT YOUR DRINKING: NO
HAVE PEOPLE ANNOYED YOU BY CRITICIZING YOUR DRINKING: NO
TOTAL SCORE: 0
HAVE YOU EVER FELT YOU SHOULD CUT DOWN ON YOUR DRINKING: NO
EVER HAD A DRINK FIRST THING IN THE MORNING TO STEADY YOUR NERVES TO GET RID OF A HANGOVER: NO

## 2024-12-07 ASSESSMENT — PAIN DESCRIPTION - PAIN TYPE: TYPE: ACUTE PAIN

## 2024-12-07 ASSESSMENT — PAIN - FUNCTIONAL ASSESSMENT: PAIN_FUNCTIONAL_ASSESSMENT: 0-10

## 2024-12-07 ASSESSMENT — PAIN DESCRIPTION - LOCATION: LOCATION: ANKLE

## 2024-12-07 ASSESSMENT — VISUAL ACUITY: OU: 1

## 2024-12-08 NOTE — ED PROVIDER NOTES
HPI   Chief Complaint   Patient presents with    Ankle Pain     Left ankle and heel pain, got foot caught in bed frame and attempted to pull it out and ankle twisted        Patient presents the emergency department for evaluation of a left foot and ankle injury that occurred just prior to arrival.  Patient states she got her foot stuck in a metal bed frame causing her to pop her left ankle.  She denies a history of fracture, dislocation or surgical intervention of this area in the past.  She has not taken anything prior to arrival for discomfort that is aggravated by bearing weight.  She has not noticed any redness, ecchymosis or open wound with the swelling to the lateral aspect of her ankle.  She has no concern for pregnancy as she has had a hysterectomy.  She denies being a diabetic.  Her pain is moderate in severity and persistent nature and not associated with any fall, head injury, back pain, chest pain, shortness of breath, abdominal pain, numbness, weakness or other extremity injury.      History provided by:  Patient   used: No                          [place] Coma Scale Score: 15                  Patient History   Past Medical History:   Diagnosis Date    36 weeks gestation of pregnancy ([facility]) 07/07/2022    36 weeks gestation of pregnancy    ADHD     Allergic rhinitis 07/11/2023    Bipolar 1 disorder (Multi)     Cigarette nicotine dependence without complication 07/11/2023    Encounter for supervision of normal pregnancy, unspecified, first trimester 01/05/2022    Encounter for pregnancy related examination, first trimester    Encounter for supervision of other normal pregnancy, second trimester 05/05/2022    Multigravida in second trimester    Encounter for supervision of other normal pregnancy, third trimester 07/21/2022    Multigravida in third trimester    Encounter for surveillance of injectable contraceptive 10/20/2022    Encounter for surveillance of injectable  "contraceptive    FAS (fetal alcohol syndrome) ([facility])     Hemorrhage in early pregnancy, unspecified ([facility]) 01/20/2022    Bleeding in early pregnancy    History of prediabetes     Other specified pregnancy related conditions, unspecified trimester ([facility]) 03/21/2022    Pelvic cramping in antepartum period    Other specified symptoms and signs involving the circulatory and respiratory systems 08/15/2022    Chest congestion    Personal history of other diseases of the female genital tract 07/26/2021    History of amenorrhea    Personal history of other diseases of the female genital tract 08/30/2021    History of abnormal uterine bleeding    Personal history of other mental and behavioral disorders     History of anxiety     Past Surgical History:   Procedure Laterality Date    CERVICAL BIOPSY  W/ LOOP ELECTRODE EXCISION      HYSTERECTOMY      OTHER SURGICAL HISTORY  09/28/2020    Loop electrosurgical excision procedure     Family History   Problem Relation Name Age of Onset    Hyperlipidemia Father      Atrial fibrillation Father      Hypertension Father      Kidney disease Father      Diabetes Father      Cancer Mother's Sister      Cancer Maternal Grandmother      Aneurysm Maternal Grandmother      Aneurysm Paternal Grandmother      Atrial fibrillation Paternal Grandmother      Cancer Paternal Grandfather       Social History     Tobacco Use    Smoking status: Every Day     Current packs/day: 0.25     Types: Cigarettes    Smokeless tobacco: Never   Vaping Use    Vaping status: Every Day   Substance Use Topics    Alcohol use: Never    Drug use: Never       Physical Exam   Visit Vitals  /85 (BP Location: Left arm, Patient Position: Sitting)   Pulse 84   Temp 36.7 °C (98.1 °F) (Axillary)   Resp 16   Ht 1.651 m (5' 5\")   Wt 58.1 kg (128 lb)   LMP 10/27/2021   SpO2 98%   BMI 21.30 kg/m²   OB Status Hysterectomy   Smoking Status Every Day   BSA 1.63 m²      Physical Exam  Vitals reviewed.   Constitutional:  "      Appearance: Normal appearance.   HENT:      Head: Normocephalic and atraumatic.      Right Ear: Hearing normal.      Left Ear: Hearing normal.      Nose: Nose normal.      Mouth/Throat:      Lips: Pink.      Mouth: Mucous membranes are moist.   Eyes:      General: Vision grossly intact.   Cardiovascular:      Rate and Rhythm: Normal rate and regular rhythm.      Pulses:           Dorsalis pedis pulses are 2+ on the left side.        Posterior tibial pulses are 2+ on the left side.   Pulmonary:      Effort: Pulmonary effort is normal.      Breath sounds: Normal breath sounds.   Musculoskeletal:      Cervical back: Normal range of motion and neck supple.      Right lower leg: No edema.      Left lower leg: No edema.      Comments: There is no bony tenderness to the left knee, proximal aspect of the tib-fib, midshaft tibia, medial aspect of the foot or posterior aspect of the leg.  She does have tenderness with mild swelling to the lateral malleolus on the left and some proximal fifth metatarsal tenderness without crepitus, swelling or ecchymosis.  No open wound.  Strong pedal and posterior tibial pulses.  Neurovascularly intact with compartments soft.  Achilles tendon does have some tenderness with no palpable defect, swelling, erythema or outward sign of trauma.  Patient ambulatory with no assistive device and mild limp.   Skin:     General: Skin is warm and dry.      Capillary Refill: Capillary refill takes less than 2 seconds.      Findings: No ecchymosis, erythema or wound.   Neurological:      Mental Status: She is alert and oriented to person, place, and time.      Sensory: Sensation is intact.      Motor: Motor function is intact.      Coordination: Coordination is intact.      Gait: Gait is intact.         XR ankle left 3+ views   Final Result   No acute fracture of the left ankle or foot.             MACRO:   None        Signed by: Freddy Golden 12/7/2024 11:52 PM   Dictation workstation:   [id number]       XR foot left 3+ views   Final Result   No acute fracture of the left ankle or foot.             MACRO:   None        Signed by: Freddy Golden 12/7/2024 11:52 PM   Dictation workstation:   [id number]          Labs Reviewed - No data to display    No results found for this or any previous visit (from the past 4464 hours).    ED Course & MDM     Medical Decision Making  Patient presents to the ED for evaluation of left ankle injury. Differential diagnosis of fracture, sprain and contusion to mention a few. Plan is for ice, tylenol and x-rays. Patient provides consent.     Imaging as interpreted by radiologist negative for acute findings as documented above. Plan is subsequently for symptom control with OTC tylenol, ice, rest, elevation, ace wrap, air cast and with appropriate outpatient follow-up with orthopedics as provided on their discharge handout. Patient is educated on S/S to watch for indicative of re-evaluation in the ER setting including worsening of current symptoms not responding to the treatment plan. Patient verbalized understanding of instructions and is amenable to this treatment plan. Patient departed in stable condition with no social determinants of health that would obscure this outpatient management plan.        Diagnoses as of 12/08/24 0010   Sprain of left ankle, initial encounter          Your medication list        ASK your doctor about these medications        Instructions Last Dose Given Next Dose Due   acetaminophen 325 mg tablet  Commonly known as: Tylenol      Take 1 tablet (325 mg) by mouth every 6 hours if needed for mild pain (1 - 3).       albuterol 2.5 mg /3 mL (0.083 %) nebulizer solution      Use 2 - 3 times daily       albuterol 90 mcg/actuation inhaler  Commonly known as: Proventil HFA      Inhale 2 puffs every 4 hours if needed for wheezing or shortness of breath.       cyclobenzaprine 10 mg tablet  Commonly known as: Flexeril      Take 1 tablet (10 mg) by mouth 2 times a  day as needed for muscle spasms.       famotidine 20 mg tablet  Commonly known as: Pepcid      Take 1 tablet (20 mg) by mouth 2 times a day for 5 days.       hydrocortisone acetate 1 % cream      Place 1 each on the skin 2 times a day as needed (itchy skin).       metoclopramide 10 mg tablet  Commonly known as: Reglan      Take 1 tablet (10 mg) by mouth 4 times a day.       omeprazole 40 mg DR capsule  Commonly known as: PriLOSEC      Take 1 capsule (40 mg) by mouth once daily.       True Metrix Glucose Test Strip strip  Generic drug: blood sugar diagnostic      USE TO CHECK BLOOD GLUCOSE LEVEL TWO (2) TIMES PER DAY.                Procedure  Time: 0010    SPLINT APPLICATION    Indication: ankle sprain  Performed By:  Vivi Conner CNP  Risks, benefits and alternatives for the applicable procedure described. Opportunity for questions and answers provided to allow for informed decision making.  Consent: Verbal consent was obtained by the patient    Procedure:   No wounds or abrasion noted. Skin pink, warm, and dry and neurovascularly intact. A/an  ace wrap and stirrup air cast   was applied to the left foot and ankle. Active ROM intact with capillary refill brisk, skin pink, warm and dry. Patient neurovascularly intact after procedure and tolerated procedure well. Instructions reviewed and understanding verbalized.       *This report was transcribed using voice recognition software.  Every effort was made to ensure accuracy; however, inadvertent computerized transcription errors may be present.*  YOLANDA hPillips-VESTA  12/08/24         YOLANDA Phillips-VESTA  12/08/24 0012

## 2024-12-16 ENCOUNTER — TELEPHONE (OUTPATIENT)
Dept: PRIMARY CARE | Facility: CLINIC | Age: 29
End: 2024-12-16
Payer: COMMERCIAL

## 2024-12-16 DIAGNOSIS — R05.9 COUGH, UNSPECIFIED TYPE: Primary | ICD-10-CM

## 2024-12-16 RX ORDER — BROMPHENIRAMINE MALEATE, PSEUDOEPHEDRINE HYDROCHLORIDE, AND DEXTROMETHORPHAN HYDROBROMIDE 2; 30; 10 MG/5ML; MG/5ML; MG/5ML
5 SYRUP ORAL 4 TIMES DAILY PRN
Qty: 120 ML | Refills: 0 | Status: SHIPPED | OUTPATIENT
Start: 2024-12-16 | End: 2024-12-26

## 2024-12-17 ENCOUNTER — APPOINTMENT (OUTPATIENT)
Dept: CARDIOLOGY | Facility: HOSPITAL | Age: 29
End: 2024-12-17
Payer: COMMERCIAL

## 2024-12-17 ENCOUNTER — HOSPITAL ENCOUNTER (EMERGENCY)
Facility: HOSPITAL | Age: 29
Discharge: HOME | End: 2024-12-18
Attending: STUDENT IN AN ORGANIZED HEALTH CARE EDUCATION/TRAINING PROGRAM
Payer: COMMERCIAL

## 2024-12-17 VITALS
BODY MASS INDEX: 20.49 KG/M2 | RESPIRATION RATE: 16 BRPM | TEMPERATURE: 98.2 F | DIASTOLIC BLOOD PRESSURE: 78 MMHG | HEART RATE: 117 BPM | SYSTOLIC BLOOD PRESSURE: 123 MMHG | WEIGHT: 123 LBS | HEIGHT: 65 IN | OXYGEN SATURATION: 99 %

## 2024-12-17 DIAGNOSIS — J06.9 VIRAL URI: Primary | ICD-10-CM

## 2024-12-17 DIAGNOSIS — J45.901 ASTHMA WITH ACUTE EXACERBATION, UNSPECIFIED ASTHMA SEVERITY, UNSPECIFIED WHETHER PERSISTENT (HHS-HCC): ICD-10-CM

## 2024-12-17 PROCEDURE — 99285 EMERGENCY DEPT VISIT HI MDM: CPT | Mod: 25 | Performed by: STUDENT IN AN ORGANIZED HEALTH CARE EDUCATION/TRAINING PROGRAM

## 2024-12-17 PROCEDURE — 96360 HYDRATION IV INFUSION INIT: CPT

## 2024-12-17 PROCEDURE — 93005 ELECTROCARDIOGRAM TRACING: CPT

## 2024-12-17 ASSESSMENT — PAIN - FUNCTIONAL ASSESSMENT: PAIN_FUNCTIONAL_ASSESSMENT: 0-10

## 2024-12-17 ASSESSMENT — PAIN SCALES - GENERAL: PAINLEVEL_OUTOF10: 0 - NO PAIN

## 2024-12-17 ASSESSMENT — PAIN DESCRIPTION - LOCATION: LOCATION: CHEST

## 2024-12-18 ENCOUNTER — APPOINTMENT (OUTPATIENT)
Dept: RADIOLOGY | Facility: HOSPITAL | Age: 29
End: 2024-12-18
Payer: COMMERCIAL

## 2024-12-18 LAB
ANION GAP SERPL CALC-SCNC: 11 MMOL/L (ref 10–20)
ATRIAL RATE: 121 BPM
BASOPHILS # BLD AUTO: 0.05 X10*3/UL (ref 0–0.1)
BASOPHILS NFR BLD AUTO: 0.5 %
BUN SERPL-MCNC: 13 MG/DL (ref 6–23)
CALCIUM SERPL-MCNC: 9.4 MG/DL (ref 8.6–10.3)
CARDIAC TROPONIN I PNL SERPL HS: <3 NG/L (ref 0–13)
CHLORIDE SERPL-SCNC: 102 MMOL/L (ref 98–107)
CO2 SERPL-SCNC: 28 MMOL/L (ref 21–32)
CREAT SERPL-MCNC: 0.93 MG/DL (ref 0.5–1.05)
EGFRCR SERPLBLD CKD-EPI 2021: 84 ML/MIN/1.73M*2
EOSINOPHIL # BLD AUTO: 0.14 X10*3/UL (ref 0–0.7)
EOSINOPHIL NFR BLD AUTO: 1.5 %
ERYTHROCYTE [DISTWIDTH] IN BLOOD BY AUTOMATED COUNT: 13.3 % (ref 11.5–14.5)
GLUCOSE SERPL-MCNC: 100 MG/DL (ref 74–99)
HCT VFR BLD AUTO: 46.1 % (ref 36–46)
HGB BLD-MCNC: 15.5 G/DL (ref 12–16)
IMM GRANULOCYTES # BLD AUTO: 0.02 X10*3/UL (ref 0–0.7)
IMM GRANULOCYTES NFR BLD AUTO: 0.2 % (ref 0–0.9)
LYMPHOCYTES # BLD AUTO: 2.04 X10*3/UL (ref 1.2–4.8)
LYMPHOCYTES NFR BLD AUTO: 21.4 %
MCH RBC QN AUTO: 28.1 PG (ref 26–34)
MCHC RBC AUTO-ENTMCNC: 33.6 G/DL (ref 32–36)
MCV RBC AUTO: 84 FL (ref 80–100)
MONOCYTES # BLD AUTO: 0.9 X10*3/UL (ref 0.1–1)
MONOCYTES NFR BLD AUTO: 9.4 %
NEUTROPHILS # BLD AUTO: 6.38 X10*3/UL (ref 1.2–7.7)
NEUTROPHILS NFR BLD AUTO: 67 %
NRBC BLD-RTO: 0 /100 WBCS (ref 0–0)
P AXIS: 77 DEGREES
PLATELET # BLD AUTO: 232 X10*3/UL (ref 150–450)
POTASSIUM SERPL-SCNC: 4.3 MMOL/L (ref 3.5–5.3)
PR INTERVAL: 153 MS
Q ONSET: 253 MS
QRS COUNT: 19 BEATS
QRS DURATION: 83 MS
QT INTERVAL: 356 MS
QTC CALCULATION(BAZETT): 503 MS
QTC FREDERICIA: 448 MS
R AXIS: 236 DEGREES
RBC # BLD AUTO: 5.52 X10*6/UL (ref 4–5.2)
SODIUM SERPL-SCNC: 137 MMOL/L (ref 136–145)
T AXIS: 6 DEGREES
T OFFSET: 431 MS
VENTRICULAR RATE: 120 BPM
WBC # BLD AUTO: 9.5 X10*3/UL (ref 4.4–11.3)

## 2024-12-18 PROCEDURE — 2500000004 HC RX 250 GENERAL PHARMACY W/ HCPCS (ALT 636 FOR OP/ED): Performed by: STUDENT IN AN ORGANIZED HEALTH CARE EDUCATION/TRAINING PROGRAM

## 2024-12-18 PROCEDURE — 84484 ASSAY OF TROPONIN QUANT: CPT | Performed by: STUDENT IN AN ORGANIZED HEALTH CARE EDUCATION/TRAINING PROGRAM

## 2024-12-18 PROCEDURE — 71046 X-RAY EXAM CHEST 2 VIEWS: CPT

## 2024-12-18 PROCEDURE — 36415 COLL VENOUS BLD VENIPUNCTURE: CPT | Performed by: STUDENT IN AN ORGANIZED HEALTH CARE EDUCATION/TRAINING PROGRAM

## 2024-12-18 PROCEDURE — 71046 X-RAY EXAM CHEST 2 VIEWS: CPT | Performed by: SURGERY

## 2024-12-18 PROCEDURE — 96360 HYDRATION IV INFUSION INIT: CPT

## 2024-12-18 PROCEDURE — 85025 COMPLETE CBC W/AUTO DIFF WBC: CPT | Performed by: STUDENT IN AN ORGANIZED HEALTH CARE EDUCATION/TRAINING PROGRAM

## 2024-12-18 PROCEDURE — 80048 BASIC METABOLIC PNL TOTAL CA: CPT | Performed by: STUDENT IN AN ORGANIZED HEALTH CARE EDUCATION/TRAINING PROGRAM

## 2024-12-18 RX ORDER — PREDNISONE 50 MG/1
50 TABLET ORAL ONCE
Status: COMPLETED | OUTPATIENT
Start: 2024-12-18 | End: 2024-12-18

## 2024-12-18 RX ORDER — PREDNISONE 50 MG/1
50 TABLET ORAL DAILY
Qty: 5 TABLET | Refills: 0 | Status: SHIPPED | OUTPATIENT
Start: 2024-12-18 | End: 2024-12-23

## 2024-12-18 NOTE — ED PROVIDER NOTES
HPI   Chief Complaint   Patient presents with    Chest Pain       29-year-old female with no apparent past medical issues presents ED with a cough.  Patient been having this cough for last 2 days.  She is developing chest pain.  She says the chest pain only happens when she coughs.  No pain outside of coughing.  No shortness of breath.  No fevers.  No calf pain lower extremity swelling.  No prior VTE, recent surgery, immobilization or active cancer.              Patient History   Past Medical History:   Diagnosis Date    36 weeks gestation of pregnancy ([facility]) 07/07/2022    36 weeks gestation of pregnancy    ADHD     Allergic rhinitis 07/11/2023    Bipolar 1 disorder (Multi)     Cigarette nicotine dependence without complication 07/11/2023    Encounter for supervision of normal pregnancy, unspecified, first trimester 01/05/2022    Encounter for pregnancy related examination, first trimester    Encounter for supervision of other normal pregnancy, second trimester 05/05/2022    Multigravida in second trimester    Encounter for supervision of other normal pregnancy, third trimester 07/21/2022    Multigravida in third trimester    Encounter for surveillance of injectable contraceptive 10/20/2022    Encounter for surveillance of injectable contraceptive    FAS (fetal alcohol syndrome) ([facility])     Hemorrhage in early pregnancy, unspecified ([facility]) 01/20/2022    Bleeding in early pregnancy    History of prediabetes     Other specified pregnancy related conditions, unspecified trimester ([facility]) 03/21/2022    Pelvic cramping in antepartum period    Other specified symptoms and signs involving the circulatory and respiratory systems 08/15/2022    Chest congestion    Personal history of other diseases of the female genital tract 07/26/2021    History of amenorrhea    Personal history of other diseases of the female genital tract 08/30/2021    History of abnormal uterine bleeding    Personal history of other mental and  behavioral disorders     History of anxiety     Past Surgical History:   Procedure Laterality Date    CERVICAL BIOPSY  W/ LOOP ELECTRODE EXCISION      HYSTERECTOMY      OTHER SURGICAL HISTORY  09/28/2020    Loop electrosurgical excision procedure     Family History   Problem Relation Name Age of Onset    Hyperlipidemia Father      Atrial fibrillation Father      Hypertension Father      Kidney disease Father      Diabetes Father      Cancer Mother's Sister      Cancer Maternal Grandmother      Aneurysm Maternal Grandmother      Aneurysm Paternal Grandmother      Atrial fibrillation Paternal Grandmother      Cancer Paternal Grandfather       Social History     Tobacco Use    Smoking status: Every Day     Current packs/day: 0.25     Types: Cigarettes    Smokeless tobacco: Never   Vaping Use    Vaping status: Every Day   Substance Use Topics    Alcohol use: Never    Drug use: Never       Physical Exam   ED Triage Vitals [12/17/24 2307]   Temperature Heart Rate Respirations BP   36.8 °C (98.2 °F) (!) 117 16 123/78      Pulse Ox Temp src Heart Rate Source Patient Position   99 % -- -- --      BP Location FiO2 (%)     -- --       Physical Exam  Vitals and nursing note reviewed.   Constitutional:       General: She is not in acute distress.     Appearance: She is well-developed.   HENT:      Head: Normocephalic and atraumatic.   Eyes:      Conjunctiva/sclera: Conjunctivae normal.   Cardiovascular:      Rate and Rhythm: Normal rate and regular rhythm.      Pulses:           Radial pulses are 2+ on the right side.        Dorsalis pedis pulses are 2+ on the right side.        Posterior tibial pulses are 2+ on the right side.      Heart sounds: No murmur heard.  Pulmonary:      Effort: Pulmonary effort is normal. No respiratory distress.      Breath sounds: Normal breath sounds.   Abdominal:      Palpations: Abdomen is soft.      Tenderness: There is no abdominal tenderness.   Musculoskeletal:         General: No swelling.       Cervical back: Neck supple.      Right lower leg: No tenderness. No edema.      Left lower leg: No tenderness. No edema.   Skin:     General: Skin is warm and dry.      Capillary Refill: Capillary refill takes less than 2 seconds.   Neurological:      Mental Status: She is alert.   Psychiatric:         Mood and Affect: Mood normal.           ED Course & MDM   ED Course as of 12/18/24 0113   Wed Dec 18, 2024   0006 EKG shows sinus tachycardia.  Multiple PVCs.  No STEMI.  Normal axis. [RS]      ED Course User Index  [RS] Yobani Amanda DO         Diagnoses as of 12/18/24 0113   Viral URI   Asthma with acute exacerbation, unspecified asthma severity, unspecified whether persistent ([facility])                 No data recorded     [place] Coma Scale Score: 15 (12/17/24 2308 : Dianna Herrera RN)                           Medical Decision Making  HISTORIAN:  Patient    CHART REVIEW:  No pertinent findings    PT SUMMARY:  29-year-old female presents ED with cough and chest pain while coughing.  She is tachycardic upon arrival otherwise stable.    DDX:  ACS, pneumonia, pneumothorax, PE, aortic dissection, muscle skeletal pain, gastric related pain      PLAN:  Will obtain CBC, BMP, EKG, troponin, chest x-ray    DISPO/RE-EVAL:  Patient's labs show no acute abnormalities.  Chest x-ray is negative.  Patient symptoms likely due to viral syndrome along with asthma exacerbation.  She is satting appropriately and in no respiratory distress.  Will discharge patient home with follow-up from primary care.  Will start her on prednisone.  Advised to come back to ED for any new or worsening symptoms.          Procedure  Procedures     Yobani Amanda DO  12/18/24 0113

## 2024-12-18 NOTE — ED TRIAGE NOTES
Patient presents to the ED with c/o CP that has been going on since earlier this evening.  She has been congested and not feeling well for 3 days but wanted to evaluated.

## 2025-01-14 ENCOUNTER — TELEPHONE (OUTPATIENT)
Dept: PRIMARY CARE | Facility: CLINIC | Age: 30
End: 2025-01-14
Payer: COMMERCIAL

## 2025-01-14 DIAGNOSIS — R05.9 COUGH, UNSPECIFIED TYPE: Primary | ICD-10-CM

## 2025-01-14 RX ORDER — BROMPHENIRAMINE MALEATE, PSEUDOEPHEDRINE HYDROCHLORIDE, AND DEXTROMETHORPHAN HYDROBROMIDE 2; 30; 10 MG/5ML; MG/5ML; MG/5ML
5 SYRUP ORAL 4 TIMES DAILY PRN
Qty: 120 ML | Refills: 0 | Status: SHIPPED | OUTPATIENT
Start: 2025-01-14 | End: 2025-01-24

## 2025-01-14 NOTE — TELEPHONE ENCOUNTER
brompheniramine-pseudoeph-DM 2-30-10 mg/5 mL syrup She called to see if you would send another refill on this please advise send to [facility] she got sick again but now just has a cough

## 2025-02-03 ENCOUNTER — APPOINTMENT (OUTPATIENT)
Dept: OBSTETRICS AND GYNECOLOGY | Facility: CLINIC | Age: 30
End: 2025-02-03
Payer: COMMERCIAL

## 2025-02-27 ENCOUNTER — HOSPITAL ENCOUNTER (EMERGENCY)
Facility: HOSPITAL | Age: 30
Discharge: HOME | End: 2025-02-28
Attending: STUDENT IN AN ORGANIZED HEALTH CARE EDUCATION/TRAINING PROGRAM
Payer: COMMERCIAL

## 2025-02-27 ENCOUNTER — APPOINTMENT (OUTPATIENT)
Dept: RADIOLOGY | Facility: HOSPITAL | Age: 30
End: 2025-02-27
Payer: COMMERCIAL

## 2025-02-27 DIAGNOSIS — K59.00 CONSTIPATION, UNSPECIFIED CONSTIPATION TYPE: ICD-10-CM

## 2025-02-27 DIAGNOSIS — R10.31 INTERMITTENT RIGHT LOWER QUADRANT ABDOMINAL PAIN: ICD-10-CM

## 2025-02-27 DIAGNOSIS — K52.9 COLITIS: Primary | ICD-10-CM

## 2025-02-27 LAB
ALBUMIN SERPL BCP-MCNC: 4.6 G/DL (ref 3.4–5)
ALP SERPL-CCNC: 88 U/L (ref 33–110)
ALT SERPL W P-5'-P-CCNC: 11 U/L (ref 7–45)
ANION GAP SERPL CALC-SCNC: 11 MMOL/L (ref 10–20)
APPEARANCE UR: ABNORMAL
AST SERPL W P-5'-P-CCNC: 11 U/L (ref 9–39)
B-HCG SERPL-ACNC: <2 MIU/ML
BASOPHILS # BLD AUTO: 0.07 X10*3/UL (ref 0–0.1)
BASOPHILS NFR BLD AUTO: 0.7 %
BILIRUB SERPL-MCNC: 0.4 MG/DL (ref 0–1.2)
BILIRUB UR STRIP.AUTO-MCNC: NEGATIVE MG/DL
BUN SERPL-MCNC: 13 MG/DL (ref 6–23)
CALCIUM SERPL-MCNC: 9.5 MG/DL (ref 8.6–10.3)
CHLORIDE SERPL-SCNC: 103 MMOL/L (ref 98–107)
CO2 SERPL-SCNC: 27 MMOL/L (ref 21–32)
COLOR UR: YELLOW
CREAT SERPL-MCNC: 0.95 MG/DL (ref 0.5–1.05)
EGFRCR SERPLBLD CKD-EPI 2021: 82 ML/MIN/1.73M*2
EOSINOPHIL # BLD AUTO: 0.24 X10*3/UL (ref 0–0.7)
EOSINOPHIL NFR BLD AUTO: 2.5 %
ERYTHROCYTE [DISTWIDTH] IN BLOOD BY AUTOMATED COUNT: 14 % (ref 11.5–14.5)
GLUCOSE SERPL-MCNC: 95 MG/DL (ref 74–99)
GLUCOSE UR STRIP.AUTO-MCNC: NORMAL MG/DL
HCT VFR BLD AUTO: 46 % (ref 36–46)
HGB BLD-MCNC: 15.2 G/DL (ref 12–16)
IMM GRANULOCYTES # BLD AUTO: 0.02 X10*3/UL (ref 0–0.7)
IMM GRANULOCYTES NFR BLD AUTO: 0.2 % (ref 0–0.9)
KETONES UR STRIP.AUTO-MCNC: NEGATIVE MG/DL
LACTATE SERPL-SCNC: 0.8 MMOL/L (ref 0.4–2)
LEUKOCYTE ESTERASE UR QL STRIP.AUTO: NEGATIVE
LIPASE SERPL-CCNC: 20 U/L (ref 9–82)
LYMPHOCYTES # BLD AUTO: 2.99 X10*3/UL (ref 1.2–4.8)
LYMPHOCYTES NFR BLD AUTO: 31.6 %
MCH RBC QN AUTO: 27.9 PG (ref 26–34)
MCHC RBC AUTO-ENTMCNC: 33 G/DL (ref 32–36)
MCV RBC AUTO: 85 FL (ref 80–100)
MONOCYTES # BLD AUTO: 0.61 X10*3/UL (ref 0.1–1)
MONOCYTES NFR BLD AUTO: 6.4 %
MUCOUS THREADS #/AREA URNS AUTO: NORMAL /LPF
NEUTROPHILS # BLD AUTO: 5.54 X10*3/UL (ref 1.2–7.7)
NEUTROPHILS NFR BLD AUTO: 58.6 %
NITRITE UR QL STRIP.AUTO: NEGATIVE
NRBC BLD-RTO: 0 /100 WBCS (ref 0–0)
PH UR STRIP.AUTO: 7 [PH]
PLATELET # BLD AUTO: 285 X10*3/UL (ref 150–450)
POTASSIUM SERPL-SCNC: 4.1 MMOL/L (ref 3.5–5.3)
PROT SERPL-MCNC: 7.2 G/DL (ref 6.4–8.2)
PROT UR STRIP.AUTO-MCNC: ABNORMAL MG/DL
RBC # BLD AUTO: 5.44 X10*6/UL (ref 4–5.2)
RBC # UR STRIP.AUTO: NEGATIVE MG/DL
RBC #/AREA URNS AUTO: NORMAL /HPF
SODIUM SERPL-SCNC: 137 MMOL/L (ref 136–145)
SP GR UR STRIP.AUTO: 1.03
SQUAMOUS #/AREA URNS AUTO: NORMAL /HPF
UROBILINOGEN UR STRIP.AUTO-MCNC: ABNORMAL MG/DL
WBC # BLD AUTO: 9.5 X10*3/UL (ref 4.4–11.3)
WBC #/AREA URNS AUTO: NORMAL /HPF

## 2025-02-27 PROCEDURE — 74177 CT ABD & PELVIS W/CONTRAST: CPT | Performed by: STUDENT IN AN ORGANIZED HEALTH CARE EDUCATION/TRAINING PROGRAM

## 2025-02-27 PROCEDURE — 85025 COMPLETE CBC W/AUTO DIFF WBC: CPT | Performed by: STUDENT IN AN ORGANIZED HEALTH CARE EDUCATION/TRAINING PROGRAM

## 2025-02-27 PROCEDURE — 74177 CT ABD & PELVIS W/CONTRAST: CPT

## 2025-02-27 PROCEDURE — 81001 URINALYSIS AUTO W/SCOPE: CPT | Performed by: STUDENT IN AN ORGANIZED HEALTH CARE EDUCATION/TRAINING PROGRAM

## 2025-02-27 PROCEDURE — 36415 COLL VENOUS BLD VENIPUNCTURE: CPT | Performed by: STUDENT IN AN ORGANIZED HEALTH CARE EDUCATION/TRAINING PROGRAM

## 2025-02-27 PROCEDURE — 83605 ASSAY OF LACTIC ACID: CPT | Performed by: STUDENT IN AN ORGANIZED HEALTH CARE EDUCATION/TRAINING PROGRAM

## 2025-02-27 PROCEDURE — 2550000001 HC RX 255 CONTRASTS: Performed by: STUDENT IN AN ORGANIZED HEALTH CARE EDUCATION/TRAINING PROGRAM

## 2025-02-27 PROCEDURE — 2500000004 HC RX 250 GENERAL PHARMACY W/ HCPCS (ALT 636 FOR OP/ED): Performed by: STUDENT IN AN ORGANIZED HEALTH CARE EDUCATION/TRAINING PROGRAM

## 2025-02-27 PROCEDURE — 83690 ASSAY OF LIPASE: CPT | Performed by: STUDENT IN AN ORGANIZED HEALTH CARE EDUCATION/TRAINING PROGRAM

## 2025-02-27 PROCEDURE — 99285 EMERGENCY DEPT VISIT HI MDM: CPT | Mod: 25 | Performed by: STUDENT IN AN ORGANIZED HEALTH CARE EDUCATION/TRAINING PROGRAM

## 2025-02-27 PROCEDURE — 84702 CHORIONIC GONADOTROPIN TEST: CPT | Performed by: STUDENT IN AN ORGANIZED HEALTH CARE EDUCATION/TRAINING PROGRAM

## 2025-02-27 PROCEDURE — 96361 HYDRATE IV INFUSION ADD-ON: CPT

## 2025-02-27 PROCEDURE — 80053 COMPREHEN METABOLIC PANEL: CPT | Performed by: STUDENT IN AN ORGANIZED HEALTH CARE EDUCATION/TRAINING PROGRAM

## 2025-02-27 PROCEDURE — 96374 THER/PROPH/DIAG INJ IV PUSH: CPT

## 2025-02-27 RX ORDER — METOCLOPRAMIDE HYDROCHLORIDE 5 MG/ML
10 INJECTION INTRAMUSCULAR; INTRAVENOUS ONCE
Status: COMPLETED | OUTPATIENT
Start: 2025-02-27 | End: 2025-02-27

## 2025-02-27 RX ADMIN — METOCLOPRAMIDE 10 MG: 5 INJECTION, SOLUTION INTRAMUSCULAR; INTRAVENOUS at 22:54

## 2025-02-27 RX ADMIN — SODIUM CHLORIDE 1000 ML: 9 INJECTION, SOLUTION INTRAVENOUS at 22:51

## 2025-02-27 RX ADMIN — IOHEXOL 75 ML: 350 INJECTION, SOLUTION INTRAVENOUS at 23:43

## 2025-02-27 ASSESSMENT — LIFESTYLE VARIABLES
HAVE YOU EVER FELT YOU SHOULD CUT DOWN ON YOUR DRINKING: NO
HAVE PEOPLE ANNOYED YOU BY CRITICIZING YOUR DRINKING: NO
EVER HAD A DRINK FIRST THING IN THE MORNING TO STEADY YOUR NERVES TO GET RID OF A HANGOVER: NO
EVER FELT BAD OR GUILTY ABOUT YOUR DRINKING: NO
TOTAL SCORE: 0

## 2025-02-27 ASSESSMENT — PAIN SCALES - GENERAL: PAINLEVEL_OUTOF10: 6

## 2025-02-27 ASSESSMENT — PAIN - FUNCTIONAL ASSESSMENT: PAIN_FUNCTIONAL_ASSESSMENT: 0-10

## 2025-02-28 VITALS
OXYGEN SATURATION: 95 % | SYSTOLIC BLOOD PRESSURE: 123 MMHG | BODY MASS INDEX: 21.99 KG/M2 | HEART RATE: 90 BPM | RESPIRATION RATE: 20 BRPM | WEIGHT: 132 LBS | TEMPERATURE: 97.5 F | DIASTOLIC BLOOD PRESSURE: 88 MMHG | HEIGHT: 65 IN

## 2025-02-28 LAB — HOLD SPECIMEN: NORMAL

## 2025-02-28 RX ORDER — METOCLOPRAMIDE 10 MG/1
10 TABLET ORAL EVERY 6 HOURS PRN
Qty: 28 TABLET | Refills: 0 | Status: SHIPPED | OUTPATIENT
Start: 2025-02-28 | End: 2025-03-07

## 2025-02-28 RX ORDER — DICYCLOMINE HYDROCHLORIDE 20 MG/1
20 TABLET ORAL 2 TIMES DAILY PRN
Qty: 20 TABLET | Refills: 0 | Status: SHIPPED | OUTPATIENT
Start: 2025-02-28 | End: 2025-03-10

## 2025-02-28 RX ORDER — POLYETHYLENE GLYCOL 3350 17 G/17G
17 POWDER, FOR SOLUTION ORAL DAILY
Qty: 3 PACKET | Refills: 0 | Status: SHIPPED | OUTPATIENT
Start: 2025-02-28 | End: 2025-03-03

## 2025-02-28 NOTE — ED PROVIDER NOTES
HPI   Chief Complaint   Patient presents with    Abdominal Pain     Pt is having generalized abdominal pain that started a week ago and worsened today. Pain is a cramping/shooting pain 7/10 that goes away. Pt has had a hysterectomy as well as her tubes removed.       HPI  28 yo F with history of partial hysterectomy, bipolar disorder, nicotine use presents with right lower quadrant abdominal pain. She states she has had some pain in her lower right abdomen since yesterday that has significantly worsened today. She thinks she may have had a subjective fever and she had an episode of dysuria today. She thought she was constipation as she had not had a bowel movement in 3-4 days, but she was able to have a bowel movement today that was formed stool. She does have a history of intermittent constipation and usually goes 3-4 days between bowel movements. She states she still has her appendix in place. She denies change in her chronic cough, chest pain, shortness of breath, vomiting, diarrhea, hematochezia, melena, flank pain, vaginal bleeding.      Patient History   Past Medical History:   Diagnosis Date    36 weeks gestation of pregnancy ([facility]) 07/07/2022    36 weeks gestation of pregnancy    ADHD     Allergic rhinitis 07/11/2023    Bipolar 1 disorder (Multi)     Cigarette nicotine dependence without complication 07/11/2023    Encounter for supervision of normal pregnancy, unspecified, first trimester 01/05/2022    Encounter for pregnancy related examination, first trimester    Encounter for supervision of other normal pregnancy, second trimester 05/05/2022    Multigravida in second trimester    Encounter for supervision of other normal pregnancy, third trimester 07/21/2022    Multigravida in third trimester    Encounter for surveillance of injectable contraceptive 10/20/2022    Encounter for surveillance of injectable contraceptive    FAS (fetal alcohol syndrome) ([facility])     Hemorrhage in early pregnancy,  unspecified ([facility]) 01/20/2022    Bleeding in early pregnancy    History of prediabetes     Other specified pregnancy related conditions, unspecified trimester ([facility]) 03/21/2022    Pelvic cramping in antepartum period    Other specified symptoms and signs involving the circulatory and respiratory systems 08/15/2022    Chest congestion    Personal history of other diseases of the female genital tract 07/26/2021    History of amenorrhea    Personal history of other diseases of the female genital tract 08/30/2021    History of abnormal uterine bleeding    Personal history of other mental and behavioral disorders     History of anxiety     Past Surgical History:   Procedure Laterality Date    CERVICAL BIOPSY  W/ LOOP ELECTRODE EXCISION      HYSTERECTOMY      april- 2023 has overies    OTHER SURGICAL HISTORY  09/28/2020    Loop electrosurgical excision procedure     Family History   Problem Relation Name Age of Onset    Hyperlipidemia Father      Atrial fibrillation Father      Hypertension Father      Kidney disease Father      Diabetes Father      Cancer Mother's Sister      Cancer Maternal Grandmother      Aneurysm Maternal Grandmother      Aneurysm Paternal Grandmother      Atrial fibrillation Paternal Grandmother      Cancer Paternal Grandfather       Social History     Tobacco Use    Smoking status: Every Day     Current packs/day: 0.25     Types: Cigarettes    Smokeless tobacco: Never   Vaping Use    Vaping status: Every Day   Substance Use Topics    Alcohol use: Never    Drug use: Never       Physical Exam   ED Triage Vitals [02/27/25 2159]   Temperature Heart Rate Respirations BP   36.4 °C (97.5 °F) (!) 111 20 123/88      Pulse Ox Temp src Heart Rate Source Patient Position   98 % -- -- --      BP Location FiO2 (%)     -- --       Physical Exam  Vitals reviewed.   Eyes:      Extraocular Movements: Extraocular movements intact.   Cardiovascular:      Rate and Rhythm: Regular rhythm. Tachycardia present.    Pulmonary:      Effort: Pulmonary effort is normal.      Breath sounds: Normal breath sounds. No stridor. No wheezing, rhonchi or rales.   Abdominal:      Palpations: Abdomen is soft.      Tenderness: There is abdominal tenderness in the right lower quadrant. There is no right CVA tenderness, left CVA tenderness, guarding or rebound.   Skin:     General: Skin is warm and dry.   Neurological:      General: No focal deficit present.      Mental Status: She is alert and oriented to person, place, and time.           ED Course & MDM   Diagnoses as of 02/28/25 0238   Colitis   Intermittent right lower quadrant abdominal pain   Constipation, unspecified constipation type                 No data recorded     [place] Coma Scale Score: 15 (02/27/25 2201 : Jose Couch, GIOVANY)                           Medical Decision Making  28 yo F with history of partial hysterectomy, bipolar disorder, nicotine use presents with right lower quadrant abdominal pain. She states she has had some pain in her lower right abdomen since yesterday that has worsened today. On exam, patient nontoxic appearing. Vitals stable, normotensive, not tachycardic, not tachypneic. Afebrile. Lungs CTAB. Abdomen soft with RLQ tenderness to palpation, no rebound rigidity or guarding. Workup today showing no significant leukocytosis. No anemia. No significant electrolyte derangement. Renal function at baseline. hCG negative. CT showing colitis. No evidence of appendicitis. No clinical suspicion for ovarian torsion given benign presentation and exam. Given reglan and IV fluid with improvement in pain. Now tolerating PO intake without difficulty. Discussed all results. Discussed the importance of close follow up with PCP. Discussed strict return precautions. Patient voiced understanding and agreement with plan. Discharged in stable condition.    Procedure  Procedures     Laurence Meyer MD  02/28/25 0238

## 2025-03-18 ENCOUNTER — TELEPHONE (OUTPATIENT)
Dept: PRIMARY CARE | Facility: CLINIC | Age: 30
End: 2025-03-18
Payer: COMMERCIAL

## 2025-03-18 DIAGNOSIS — R05.9 COUGH, UNSPECIFIED TYPE: Primary | ICD-10-CM

## 2025-03-18 RX ORDER — BROMPHENIRAMINE MALEATE, PSEUDOEPHEDRINE HYDROCHLORIDE, AND DEXTROMETHORPHAN HYDROBROMIDE 2; 30; 10 MG/5ML; MG/5ML; MG/5ML
5 SYRUP ORAL 4 TIMES DAILY PRN
Qty: 120 ML | Refills: 0 | Status: SHIPPED | OUTPATIENT
Start: 2025-03-18 | End: 2025-03-28

## 2025-03-18 RX ORDER — METHYLPREDNISOLONE 4 MG/1
TABLET ORAL
Qty: 21 TABLET | Refills: 0 | Status: SHIPPED | OUTPATIENT
Start: 2025-03-18 | End: 2025-03-24

## 2025-03-18 NOTE — TELEPHONE ENCOUNTER
Patient has had a cough for 2 days and would like prescription for cough syrup and prednisone because you have done it for her before. Negative covid yesterday.  [facility] [place]

## 2025-05-21 ENCOUNTER — APPOINTMENT (OUTPATIENT)
Dept: PRIMARY CARE | Facility: CLINIC | Age: 30
End: 2025-05-21
Payer: COMMERCIAL

## 2025-06-03 ENCOUNTER — TELEPHONE (OUTPATIENT)
Dept: PRIMARY CARE | Facility: CLINIC | Age: 30
End: 2025-06-03
Payer: COMMERCIAL

## 2025-06-03 DIAGNOSIS — M54.9 BACK PAIN, UNSPECIFIED BACK LOCATION, UNSPECIFIED BACK PAIN LATERALITY, UNSPECIFIED CHRONICITY: ICD-10-CM

## 2025-06-03 DIAGNOSIS — K52.9 COLITIS: ICD-10-CM

## 2025-06-03 DIAGNOSIS — R10.31 INTERMITTENT RIGHT LOWER QUADRANT ABDOMINAL PAIN: ICD-10-CM

## 2025-06-03 DIAGNOSIS — K21.9 GASTROESOPHAGEAL REFLUX DISEASE WITHOUT ESOPHAGITIS: ICD-10-CM

## 2025-06-03 DIAGNOSIS — J45.40 MODERATE PERSISTENT ASTHMA, UNSPECIFIED WHETHER COMPLICATED (HHS-HCC): ICD-10-CM

## 2025-06-03 DIAGNOSIS — L25.9 CONTACT DERMATITIS, UNSPECIFIED CONTACT DERMATITIS TYPE, UNSPECIFIED TRIGGER: ICD-10-CM

## 2025-06-03 DIAGNOSIS — R73.01 ELEVATED FASTING BLOOD SUGAR: ICD-10-CM

## 2025-06-03 RX ORDER — LANCETS 33 GAUGE
1 EACH MISCELLANEOUS DAILY
Qty: 100 EACH | Refills: 3 | Status: SHIPPED | OUTPATIENT
Start: 2025-06-03

## 2025-06-03 RX ORDER — ALBUTEROL SULFATE 0.83 MG/ML
SOLUTION RESPIRATORY (INHALATION)
Qty: 75 ML | Refills: 2 | Status: SHIPPED | OUTPATIENT
Start: 2025-06-03

## 2025-06-03 RX ORDER — HYDROCORTISONE 1 G/100G
1 CREAM TOPICAL 2 TIMES DAILY PRN
Qty: 57 G | Refills: 2 | Status: SHIPPED | OUTPATIENT
Start: 2025-06-03

## 2025-06-03 RX ORDER — ALBUTEROL SULFATE 90 UG/1
2 INHALANT RESPIRATORY (INHALATION) EVERY 4 HOURS PRN
Qty: 6.7 G | Refills: 2 | Status: SHIPPED | OUTPATIENT
Start: 2025-06-03 | End: 2026-06-03

## 2025-06-03 RX ORDER — OMEPRAZOLE 40 MG/1
40 CAPSULE, DELAYED RELEASE ORAL DAILY
Qty: 90 CAPSULE | Refills: 3 | Status: SHIPPED | OUTPATIENT
Start: 2025-06-03

## 2025-06-03 RX ORDER — CALCIUM CITRATE/VITAMIN D3 200MG-6.25
TABLET ORAL
Qty: 100 STRIP | Refills: 3 | Status: SHIPPED | OUTPATIENT
Start: 2025-06-03

## 2025-06-03 RX ORDER — EPINEPHRINE 0.15 MG/.15ML
1 INJECTION SUBCUTANEOUS AS NEEDED
COMMUNITY
End: 2025-06-03 | Stop reason: SDUPTHER

## 2025-06-03 RX ORDER — CYCLOBENZAPRINE HCL 10 MG
10 TABLET ORAL 2 TIMES DAILY PRN
Qty: 30 TABLET | Refills: 2 | Status: SHIPPED | OUTPATIENT
Start: 2025-06-03

## 2025-06-03 RX ORDER — METOCLOPRAMIDE 10 MG/1
10 TABLET ORAL EVERY 6 HOURS PRN
Qty: 28 TABLET | Refills: 0 | Status: SHIPPED | OUTPATIENT
Start: 2025-06-03 | End: 2025-06-10

## 2025-06-03 RX ORDER — EPINEPHRINE 0.15 MG/.15ML
0.15 INJECTION SUBCUTANEOUS AS NEEDED
Qty: 1 EACH | Refills: 3 | Status: SHIPPED | OUTPATIENT
Start: 2025-06-03

## 2025-06-03 NOTE — TELEPHONE ENCOUNTER
Rx refill  Metoclopramide 10 mg every 6 hrs  Omeprazole 40 mg daily  Hydrocortisone cream 1%  Cyclobenzaprine 10 mg 2 daily  Albuterol 2.5 mg/3 ml 2-3 times daily  Albuterol inhaler prn  True Metrix test strips and lancets  [facility] pharmacy

## 2025-06-04 ENCOUNTER — TELEPHONE (OUTPATIENT)
Dept: PRIMARY CARE | Facility: CLINIC | Age: 30
End: 2025-06-04
Payer: COMMERCIAL

## 2025-06-04 DIAGNOSIS — Z91.030 BEE ALLERGY STATUS: ICD-10-CM

## 2025-06-04 RX ORDER — EPINEPHRINE 0.3 MG/.3ML
1 INJECTION SUBCUTANEOUS AS NEEDED
Qty: 2 EACH | Refills: 1 | Status: SHIPPED | OUTPATIENT
Start: 2025-06-04 | End: 2026-06-04

## 2025-06-04 NOTE — TELEPHONE ENCOUNTER
EPINEPHrine (AUVI-Q) 0.15 mg/0.15 mL inj auto-injector injection     Pharmacy calling states this is for a child. Okay to pend adult?

## 2025-07-25 ENCOUNTER — APPOINTMENT (OUTPATIENT)
Dept: RADIOLOGY | Facility: HOSPITAL | Age: 30
End: 2025-07-25
Payer: COMMERCIAL

## 2025-07-25 ENCOUNTER — HOSPITAL ENCOUNTER (EMERGENCY)
Facility: HOSPITAL | Age: 30
Discharge: HOME | End: 2025-07-25
Attending: EMERGENCY MEDICINE
Payer: COMMERCIAL

## 2025-07-25 VITALS
HEART RATE: 84 BPM | SYSTOLIC BLOOD PRESSURE: 104 MMHG | HEIGHT: 65 IN | WEIGHT: 132 LBS | BODY MASS INDEX: 21.99 KG/M2 | OXYGEN SATURATION: 100 % | DIASTOLIC BLOOD PRESSURE: 73 MMHG | RESPIRATION RATE: 18 BRPM | TEMPERATURE: 98.1 F

## 2025-07-25 DIAGNOSIS — M25.551 PAIN OF RIGHT HIP: Primary | ICD-10-CM

## 2025-07-25 PROCEDURE — 73502 X-RAY EXAM HIP UNI 2-3 VIEWS: CPT | Mod: RT

## 2025-07-25 PROCEDURE — 99283 EMERGENCY DEPT VISIT LOW MDM: CPT | Performed by: EMERGENCY MEDICINE

## 2025-07-25 PROCEDURE — 73502 X-RAY EXAM HIP UNI 2-3 VIEWS: CPT | Mod: RIGHT SIDE | Performed by: STUDENT IN AN ORGANIZED HEALTH CARE EDUCATION/TRAINING PROGRAM

## 2025-07-25 PROCEDURE — 2500000001 HC RX 250 WO HCPCS SELF ADMINISTERED DRUGS (ALT 637 FOR MEDICARE OP): Performed by: EMERGENCY MEDICINE

## 2025-07-25 RX ORDER — ACETAMINOPHEN 325 MG/1
975 TABLET ORAL ONCE
Status: COMPLETED | OUTPATIENT
Start: 2025-07-25 | End: 2025-07-25

## 2025-07-25 RX ORDER — HYDROCODONE BITARTRATE AND ACETAMINOPHEN 5; 325 MG/1; MG/1
1 TABLET ORAL EVERY 6 HOURS PRN
Qty: 12 TABLET | Refills: 0 | Status: SHIPPED | OUTPATIENT
Start: 2025-07-25 | End: 2025-07-28

## 2025-07-25 RX ORDER — CYCLOBENZAPRINE HCL 10 MG
10 TABLET ORAL 2 TIMES DAILY PRN
Qty: 10 TABLET | Refills: 0 | Status: SHIPPED | OUTPATIENT
Start: 2025-07-25

## 2025-07-25 RX ORDER — OXYCODONE HYDROCHLORIDE 5 MG/1
5 TABLET ORAL ONCE
Refills: 0 | Status: DISCONTINUED | OUTPATIENT
Start: 2025-07-25 | End: 2025-07-25 | Stop reason: HOSPADM

## 2025-07-25 RX ORDER — CYCLOBENZAPRINE HCL 10 MG
5 TABLET ORAL ONCE
Status: DISCONTINUED | OUTPATIENT
Start: 2025-07-25 | End: 2025-07-25 | Stop reason: HOSPADM

## 2025-07-25 RX ADMIN — ACETAMINOPHEN 975 MG: 325 TABLET ORAL at 02:17

## 2025-07-25 ASSESSMENT — LIFESTYLE VARIABLES
TOTAL SCORE: 0
EVER HAD A DRINK FIRST THING IN THE MORNING TO STEADY YOUR NERVES TO GET RID OF A HANGOVER: NO
EVER FELT BAD OR GUILTY ABOUT YOUR DRINKING: NO
HAVE PEOPLE ANNOYED YOU BY CRITICIZING YOUR DRINKING: NO
HAVE YOU EVER FELT YOU SHOULD CUT DOWN ON YOUR DRINKING: NO

## 2025-07-25 ASSESSMENT — PAIN DESCRIPTION - ORIENTATION
ORIENTATION: RIGHT
ORIENTATION: RIGHT

## 2025-07-25 ASSESSMENT — PAIN - FUNCTIONAL ASSESSMENT
PAIN_FUNCTIONAL_ASSESSMENT: 0-10
PAIN_FUNCTIONAL_ASSESSMENT: 0-10

## 2025-07-25 ASSESSMENT — PAIN DESCRIPTION - PAIN TYPE
TYPE: ACUTE PAIN
TYPE: ACUTE PAIN;CHRONIC PAIN

## 2025-07-25 ASSESSMENT — PAIN SCALES - GENERAL
PAINLEVEL_OUTOF10: 8
PAINLEVEL_OUTOF10: 8
PAINLEVEL_OUTOF10: 6

## 2025-07-25 ASSESSMENT — PAIN DESCRIPTION - LOCATION
LOCATION: HIP
LOCATION: HIP

## 2025-07-25 NOTE — ED PROVIDER NOTES
Cari Reina is a 30 y.o. patient presenting to the ED for right-sided hip pain.  The patient states she had pelvic fractures in 2011 in 2012.  The pain is similar to this although she denies any recent falls or injuries.  It has been building over the course of the day today.  She denies any other associated symptoms.  No recent fever or illness, no numbness.  It is worse when trying to stand or ambulate.  She did take Tylenol without improvement at home.    Additional History Obtained from: None  Limitations to History: None  ------------------------------------------------------------------------------------------------------------------------------------------  Physical Exam:  Appearance: Alert, cooperative,   Skin: Warm, dry.  No skin changes overlying right hip.  Eyes: Cornea clear. No scleral icterus or injection.   ENT: Mucous membranes moist.  Pulmonary: No accessory muscle use or stridor. Clear lung sounds bilaterally without rhonchi or wheezing.   Cardiac: Heart sounds regular without murmur. B/L radial pulses full and symmetric.   Abdomen: Soft, not tender.  No rebound or guarding.   Musculoskeletal: No gross deformities.   Neurological: Face symmetrical. Voice clear. Appropriately conversant.   Psychiatric: Appropriate mood and affect.    Medical Decision Making:  Chronic Medical Conditions Significantly Affecting Care:  has a past medical history of 36 weeks gestation of pregnancy ([facility]) (07/07/2022), ADHD, Allergic rhinitis (07/11/2023), Bipolar 1 disorder (Multi), Cigarette nicotine dependence without complication (07/11/2023), Encounter for supervision of normal pregnancy, unspecified, first trimester (01/05/2022), Encounter for supervision of other normal pregnancy, second trimester (05/05/2022), Encounter for supervision of other normal pregnancy, third trimester (07/21/2022), Encounter for surveillance of injectable contraceptive (10/20/2022), FAS (fetal alcohol syndrome) ([facility]),  Hemorrhage in early pregnancy, unspecified ([facility]) (01/20/2022), History of prediabetes, Other specified pregnancy related conditions, unspecified trimester ([facility]) (03/21/2022), Other specified symptoms and signs involving the circulatory and respiratory systems (08/15/2022), Personal history of other diseases of the female genital tract (07/26/2021), Personal history of other diseases of the female genital tract (08/30/2021), and Personal history of other mental and behavioral disorders.    Social Determinants of Health Significantly Affecting Care: None identified    Differential Diagnosis Considered but not limited to: Patient presenting with nontraumatic right hip pain.  She does have prior injury to this area.  The joint is not red, warm, swollen and she does not have systemic symptoms to suggest infectious cause.  Will obtain x-rays and treat symptomatically.      External Records Reviewed:   I reviewed recent and relevant outside records including:     Independent Interpretation of Studies: The following studies were ordered as part of the emergency department work up and independently interpreted by me. See ED Course for details.           Escalation of Care:        Discussion of Management with Other Providers:   I discussed the patient/results with:Cari Reina is a 30 y.o. patient presenting to the ED for     Additional History Obtained from:   Limitations to History:  ------------------------------------------------------------------------------------------------------------------------------------------  Physical Exam:  Appearance: Alert, cooperative,   Skin: Warm, dry, appropriate color for ethnicity.  Eyes: Cornea clear. No scleral icterus or injection.   ENT: Mucous membranes moist.  Pulmonary: No accessory muscle use or stridor. Clear lung sounds bilaterally without rhonchi or wheezing.   Cardiac: Heart sounds regular without murmur. B/L radial pulses full and symmetric.   Abdomen: Soft, not  tender.  No rebound or guarding.   Musculoskeletal: No gross deformities.   Neurological: Face symmetrical. Voice clear. Appropriately conversant.   Psychiatric: Appropriate mood and affect.    Medical Decision Making:  Chronic Medical Conditions Significantly Affecting Care:  has a past medical history of 36 weeks gestation of pregnancy ([facility]) (07/07/2022), ADHD, Allergic rhinitis (07/11/2023), Bipolar 1 disorder (Multi), Cigarette nicotine dependence without complication (07/11/2023), Encounter for supervision of normal pregnancy, unspecified, first trimester (01/05/2022), Encounter for supervision of other normal pregnancy, second trimester (05/05/2022), Encounter for supervision of other normal pregnancy, third trimester (07/21/2022), Encounter for surveillance of injectable contraceptive (10/20/2022), FAS (fetal alcohol syndrome) ([facility]), Hemorrhage in early pregnancy, unspecified ([facility]) (01/20/2022), History of prediabetes, Other specified pregnancy related conditions, unspecified trimester ([facility]) (03/21/2022), Other specified symptoms and signs involving the circulatory and respiratory systems (08/15/2022), Personal history of other diseases of the female genital tract (07/26/2021), Personal history of other diseases of the female genital tract (08/30/2021), and Personal history of other mental and behavioral disorders.    Social Determinants of Health Significantly Affecting Care:    Differential Diagnosis Considered but not limited to:       External Records Reviewed:   I reviewed recent and relevant outside records including:     Independent Interpretation of Studies: The following studies were ordered as part of the emergency department work up and independently interpreted by me. See ED Course for details.           Escalation of Care:        Discussion of Management with Other Providers:   I discussed the patient/results with:

## 2025-08-05 ENCOUNTER — OFFICE VISIT (OUTPATIENT)
Dept: ORTHOPEDIC SURGERY | Facility: HOSPITAL | Age: 30
End: 2025-08-05
Payer: COMMERCIAL

## 2025-08-05 VITALS — WEIGHT: 132 LBS | HEIGHT: 65 IN | BODY MASS INDEX: 21.99 KG/M2

## 2025-08-05 DIAGNOSIS — S76.011A REPETITIVE STRAIN INJURY OF RIGHT HIP, INITIAL ENCOUNTER: ICD-10-CM

## 2025-08-05 DIAGNOSIS — S76.011A STRAIN OF HIP FLEXOR, RIGHT, INITIAL ENCOUNTER: Primary | ICD-10-CM
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
## 2025-08-05 PROCEDURE — 99213 OFFICE O/P EST LOW 20 MIN: CPT | Performed by: SPECIALIST

## 2025-08-05 PROCEDURE — 3008F BODY MASS INDEX DOCD: CPT | Performed by: SPECIALIST

## 2025-08-05 PROCEDURE — 99212 OFFICE O/P EST SF 10 MIN: CPT | Performed by: SPECIALIST

## 2025-08-05 ASSESSMENT — ENCOUNTER SYMPTOMS
LOSS OF SENSATION IN FEET: 0
OCCASIONAL FEELINGS OF UNSTEADINESS: 0
DEPRESSION: 0

## 2025-08-05 NOTE — PROGRESS NOTES
NPV Referred BY ED Right Hip Pain   XR Done 07/25/2025      Patient having hip pain for a couple weeks after having a fall in the shower. Patient has history of fracturing the pelvic area in 2011 and 2012 when she was thrown of a horse.Patient was seen at ED given flexeril and hydrocodone for the pain,Which have helped with the pain     She describes the pain in the region of the ASIS and the anterior hip.  She states the pain is getting worse since the time of the injury.    Exam: Patient alert and oriented x 3 no acute distress.  Visual exam of the right ASIS region of the pelvis shows no signs of erythema or external ecchymosis/swelling.    Distal neurovascular status right lower extremity intact pain is also exacerbated with active hip flexion and passive internal/external rotation.  No other acute findings.    Radiographs: AP pelvis AP and lateral right hip films show no obvious fracture.    Assessment plan: Possible rectus femoris muscle/tendon tear.  In the setting of it worsening within the first month and failing to improve with conservative measures, would recommend MRI of the right hip to grade the injury.  Follow-up after the above

## 2025-08-23 ENCOUNTER — APPOINTMENT (OUTPATIENT)
Dept: RADIOLOGY | Facility: HOSPITAL | Age: 30
End: 2025-08-23
Payer: COMMERCIAL

## 2025-09-06 LAB
25(OH)D3+25(OH)D2 SERPL-MCNC: 34 NG/ML (ref 30–100)
ALBUMIN SERPL-MCNC: 4.4 G/DL (ref 3.6–5.1)
ALP SERPL-CCNC: 79 U/L (ref 31–125)
ALT SERPL-CCNC: 14 U/L (ref 6–29)
ANION GAP SERPL CALCULATED.4IONS-SCNC: 9 MMOL/L (CALC) (ref 7–17)
AST SERPL-CCNC: 10 U/L (ref 10–30)
BILIRUB SERPL-MCNC: 0.6 MG/DL (ref 0.2–1.2)
BUN SERPL-MCNC: 16 MG/DL (ref 7–25)
CALCIUM SERPL-MCNC: 9.2 MG/DL (ref 8.6–10.2)
CHLORIDE SERPL-SCNC: 104 MMOL/L (ref 98–110)
CHOLEST SERPL-MCNC: 203 MG/DL
CHOLEST/HDLC SERPL: 4.5 (CALC)
CO2 SERPL-SCNC: 27 MMOL/L (ref 20–32)
CREAT SERPL-MCNC: 0.83 MG/DL (ref 0.5–0.97)
EGFRCR SERPLBLD CKD-EPI 2021: 96 ML/MIN/1.73M2
ERYTHROCYTE [DISTWIDTH] IN BLOOD BY AUTOMATED COUNT: 12.1 % (ref 11–15)
GLUCOSE SERPL-MCNC: 87 MG/DL (ref 65–99)
HCT VFR BLD AUTO: 45.2 % (ref 35–45)
HCV AB SERPL QL IA: NORMAL
HDLC SERPL-MCNC: 45 MG/DL
HGB BLD-MCNC: 14.9 G/DL (ref 11.7–15.5)
LDLC SERPL CALC-MCNC: 142 MG/DL (CALC)
MAGNESIUM SERPL-MCNC: 2.2 MG/DL (ref 1.5–2.5)
MCH RBC QN AUTO: 29.2 PG (ref 27–33)
MCHC RBC AUTO-ENTMCNC: 33 G/DL (ref 32–36)
MCV RBC AUTO: 88.6 FL (ref 80–100)
NONHDLC SERPL-MCNC: 158 MG/DL (CALC)
PLATELET # BLD AUTO: 279 THOUSAND/UL (ref 140–400)
PMV BLD REES-ECKER: 12 FL (ref 7.5–12.5)
POTASSIUM SERPL-SCNC: 4 MMOL/L (ref 3.5–5.3)
PROT SERPL-MCNC: 6.9 G/DL (ref 6.1–8.1)
RBC # BLD AUTO: 5.1 MILLION/UL (ref 3.8–5.1)
SODIUM SERPL-SCNC: 140 MMOL/L (ref 135–146)
TRIGL SERPL-MCNC: 67 MG/DL
TSH SERPL-ACNC: 1.55 MIU/L
VIT B12 SERPL-MCNC: 419 PG/ML (ref 200–1100)
WBC # BLD AUTO: 6.6 THOUSAND/UL (ref 3.8–10.8)

## 2025-09-11 ENCOUNTER — APPOINTMENT (OUTPATIENT)
Dept: PRIMARY CARE | Facility: CLINIC | Age: 30
End: 2025-09-11
Payer: COMMERCIAL

## 2025-10-27 ENCOUNTER — APPOINTMENT (OUTPATIENT)
Dept: OBSTETRICS AND GYNECOLOGY | Facility: CLINIC | Age: 30
End: 2025-10-27
Payer: COMMERCIAL

## 2026-04-28 ENCOUNTER — APPOINTMENT (OUTPATIENT)
Dept: OBSTETRICS AND GYNECOLOGY | Facility: CLINIC | Age: 31
End: 2026-04-28
Payer: COMMERCIAL